# Patient Record
Sex: FEMALE | Race: WHITE | NOT HISPANIC OR LATINO | Employment: UNEMPLOYED | URBAN - METROPOLITAN AREA
[De-identification: names, ages, dates, MRNs, and addresses within clinical notes are randomized per-mention and may not be internally consistent; named-entity substitution may affect disease eponyms.]

---

## 2017-05-03 ENCOUNTER — TELEPHONE (OUTPATIENT)
Dept: OBSTETRICS AND GYNECOLOGY | Facility: CLINIC | Age: 22
End: 2017-05-03

## 2017-05-03 NOTE — TELEPHONE ENCOUNTER
----- Message from Anthony Banerjee sent at 5/3/2017  4:35 PM CDT -----  Contact: [phone]/self  Pt would like to speak with the nurse about getting her tubes tied.  Please advise

## 2017-05-03 NOTE — TELEPHONE ENCOUNTER
Returned patients call.   Patient notified that the physician normally does not due this due to age and her only having one child. Patient verbalized understandign.

## 2017-07-11 ENCOUNTER — TELEPHONE (OUTPATIENT)
Dept: OBSTETRICS AND GYNECOLOGY | Facility: CLINIC | Age: 22
End: 2017-07-11

## 2017-07-11 NOTE — TELEPHONE ENCOUNTER
----- Message from Bela Bradley sent at 7/11/2017 12:18 PM CDT -----  Contact: self/[phone]  Patient called to find out if she can be seen today as she thought appointment was for today and not yesterday.    Please call at [phone] and advise.

## 2017-08-14 ENCOUNTER — TELEPHONE (OUTPATIENT)
Dept: OBSTETRICS AND GYNECOLOGY | Facility: CLINIC | Age: 22
End: 2017-08-14

## 2017-08-14 RX ORDER — BUPROPION HYDROCHLORIDE 150 MG/1
TABLET ORAL
Refills: 0 | COMMUNITY
Start: 2017-07-13 | End: 2018-05-31

## 2017-08-14 RX ORDER — PRAZOSIN HYDROCHLORIDE 2 MG/1
CAPSULE ORAL
Refills: 0 | COMMUNITY
Start: 2017-07-13 | End: 2018-05-31

## 2017-08-14 RX ORDER — CLONIDINE HYDROCHLORIDE 0.1 MG/1
TABLET ORAL
Refills: 0 | COMMUNITY
Start: 2017-06-13 | End: 2018-05-31

## 2017-08-14 RX ORDER — FLUORIDE (SODIUM) 1.1 %
PASTE (ML) DENTAL
Refills: 0 | COMMUNITY
Start: 2017-06-07 | End: 2018-05-31

## 2017-08-14 RX ORDER — SERTRALINE HYDROCHLORIDE 100 MG/1
TABLET, FILM COATED ORAL
Refills: 0 | COMMUNITY
Start: 2017-07-13 | End: 2018-05-31

## 2017-08-14 RX ORDER — QUETIAPINE FUMARATE 300 MG/1
TABLET, FILM COATED ORAL
Refills: 0 | COMMUNITY
Start: 2017-07-13 | End: 2018-05-31

## 2017-08-14 RX ORDER — QUETIAPINE FUMARATE 50 MG/1
TABLET, FILM COATED ORAL
Refills: 0 | COMMUNITY
Start: 2017-06-13 | End: 2018-05-31

## 2017-08-14 RX ORDER — MEDROXYPROGESTERONE ACETATE 150 MG/ML
INJECTION, SUSPENSION INTRAMUSCULAR
Refills: 2 | COMMUNITY
Start: 2017-05-08 | End: 2018-12-10

## 2017-08-14 NOTE — TELEPHONE ENCOUNTER
----- Message from Sarah Harris sent at 8/14/2017  2:10 PM CDT -----  Contact: [phone]/self   Patient says her partner have syphilis and she would like to know if she has ever been tested for that before and when was the last time if she has. Please advise.

## 2017-08-14 NOTE — TELEPHONE ENCOUNTER
Returned patients call.   Patient is calling stating that her partner has tested positive for syphilis and she has gone to her local health unit this morning to give blood and be tested herself. Patient was notified that the office does not have any record in her chart for her being tested for syphilis and such testing is only done by patient request. Patient was notified to follow up with the office for further STD testing and annual. Patient verbalized understanding.

## 2017-09-21 ENCOUNTER — OFFICE VISIT (OUTPATIENT)
Dept: URGENT CARE | Facility: CLINIC | Age: 22
End: 2017-09-21
Payer: MEDICAID

## 2017-09-21 VITALS
RESPIRATION RATE: 18 BRPM | OXYGEN SATURATION: 97 % | SYSTOLIC BLOOD PRESSURE: 111 MMHG | WEIGHT: 135 LBS | DIASTOLIC BLOOD PRESSURE: 71 MMHG | BODY MASS INDEX: 26.5 KG/M2 | HEIGHT: 60 IN | HEART RATE: 85 BPM | TEMPERATURE: 99 F

## 2017-09-21 DIAGNOSIS — J32.9 SINUSITIS, UNSPECIFIED CHRONICITY, UNSPECIFIED LOCATION: Primary | ICD-10-CM

## 2017-09-21 PROCEDURE — 3008F BODY MASS INDEX DOCD: CPT | Mod: S$GLB,,, | Performed by: NURSE PRACTITIONER

## 2017-09-21 PROCEDURE — 96372 THER/PROPH/DIAG INJ SC/IM: CPT | Mod: S$GLB,,, | Performed by: NURSE PRACTITIONER

## 2017-09-21 PROCEDURE — 99203 OFFICE O/P NEW LOW 30 MIN: CPT | Mod: 25,S$GLB,, | Performed by: NURSE PRACTITIONER

## 2017-09-21 RX ORDER — BETAMETHASONE SODIUM PHOSPHATE AND BETAMETHASONE ACETATE 3; 3 MG/ML; MG/ML
6 INJECTION, SUSPENSION INTRA-ARTICULAR; INTRALESIONAL; INTRAMUSCULAR; SOFT TISSUE
Status: DISCONTINUED | OUTPATIENT
Start: 2017-09-21 | End: 2017-09-21

## 2017-09-21 RX ORDER — CEFDINIR 300 MG/1
300 CAPSULE ORAL EVERY 12 HOURS
Qty: 14 CAPSULE | Refills: 0 | Status: SHIPPED | OUTPATIENT
Start: 2017-09-25 | End: 2017-10-02

## 2017-09-21 RX ORDER — DEXAMETHASONE SODIUM PHOSPHATE 100 MG/10ML
8 INJECTION INTRAMUSCULAR; INTRAVENOUS
Status: COMPLETED | OUTPATIENT
Start: 2017-09-21 | End: 2017-09-21

## 2017-09-21 RX ADMIN — DEXAMETHASONE SODIUM PHOSPHATE 8 MG: 100 INJECTION INTRAMUSCULAR; INTRAVENOUS at 01:09

## 2017-09-21 NOTE — PROGRESS NOTES
Subjective:       Patient ID: Lisha Sousa is a 22 y.o. female.    Vitals:  height is 5' (1.524 m) and weight is 61.2 kg (135 lb). Her oral temperature is 99.1 °F (37.3 °C). Her blood pressure is 111/71 and her pulse is 85. Her respiration is 18 and oxygen saturation is 97%.     Chief Complaint: Sinusitis    Sinusitis   This is a new problem. The current episode started in the past 7 days. The problem is unchanged. There has been no fever. Her pain is at a severity of 5/10. The pain is moderate. Associated symptoms include congestion, coughing, ear pain, headaches, sinus pressure and a sore throat. Pertinent negatives include no chills, hoarse voice or shortness of breath. Past treatments include oral decongestants. The treatment provided moderate relief.     Review of Systems   Constitution: Negative for chills, fever and malaise/fatigue.   HENT: Positive for congestion, ear pain, sinus pressure and sore throat. Negative for hoarse voice.    Eyes: Negative for discharge and redness.   Cardiovascular: Negative for chest pain, dyspnea on exertion and leg swelling.   Respiratory: Positive for cough and sputum production. Negative for shortness of breath and wheezing.    Musculoskeletal: Negative for myalgias.   Gastrointestinal: Negative for abdominal pain and nausea.   Neurological: Positive for headaches.       Objective:      Physical Exam   Constitutional: She is oriented to person, place, and time. She appears well-developed and well-nourished. She is cooperative.  Non-toxic appearance. She does not appear ill. No distress.   HENT:   Head: Normocephalic and atraumatic.   Right Ear: Hearing, tympanic membrane, external ear and ear canal normal.   Left Ear: Hearing, tympanic membrane, external ear and ear canal normal.   Nose: Mucosal edema present. No rhinorrhea or nasal deformity. No epistaxis. Right sinus exhibits maxillary sinus tenderness. Right sinus exhibits no frontal sinus tenderness. Left sinus exhibits  maxillary sinus tenderness. Left sinus exhibits no frontal sinus tenderness.   Mouth/Throat: Uvula is midline and mucous membranes are normal. No trismus in the jaw. Normal dentition. No uvula swelling. Posterior oropharyngeal erythema (mild with cobblestoning) present.   Eyes: Conjunctivae and lids are normal. No scleral icterus.   Sclera clear bilat   Neck: Trachea normal, full passive range of motion without pain and phonation normal. Neck supple.   Cardiovascular: Normal rate, regular rhythm, normal heart sounds, intact distal pulses and normal pulses.    Pulmonary/Chest: Effort normal and breath sounds normal. No respiratory distress.   Abdominal: Soft. Normal appearance and bowel sounds are normal. She exhibits no distension. There is no tenderness.   Musculoskeletal: Normal range of motion. She exhibits no edema or deformity.   Neurological: She is alert and oriented to person, place, and time. She exhibits normal muscle tone. Coordination normal.   Skin: Skin is warm, dry and intact. She is not diaphoretic. No pallor.   Psychiatric: She has a normal mood and affect. Her speech is normal and behavior is normal. Judgment and thought content normal. Cognition and memory are normal.   Nursing note and vitals reviewed.      Assessment:       1. Sinusitis, unspecified chronicity, unspecified location        Plan:         Sinusitis, unspecified chronicity, unspecified location  -     Discontinue: betamethasone acetate-betamethasone sodium phosphate injection 6 mg; Inject 1 mL (6 mg total) into the muscle one time.  -     cefdinir (OMNICEF) 300 MG capsule; Take 1 capsule (300 mg total) by mouth every 12 (twelve) hours.  Dispense: 14 capsule; Refill: 0  -     dexamethasone injection 8 mg; Inject 0.8 mLs (8 mg total) into the muscle one time.    Please drink plenty of fluids.  Please get plenty of rest.  Please return here or go to the Emergency Department for any concerns or worsening of condition.  If you were given  wait & see antibiotics, please wait 3-5 days before taking them, and only take them if your symptoms have worsened or not improved.  If you do begin taking the antibiotics, please take them to completion.  If you were prescribed antibiotics, please take them to completion.  If you were prescribed a narcotic medication, do not drive or operate heavy equipment or machinery while taking these medications.  If you do not have Hypertension or any history of palpitations, it is ok to take over the counter Sudafed or Mucinex D or Allegra-D or Claritin-D or Zyrtec-D.  If you do take one of the above, it is ok to combine that with plain over the counter Mucinex or Allegra or Claritin or Zyrtec.  If for example you are taking Zyrtec -D, you can combine that with Mucinex, but not Mucinex-D.  If you are taking Mucinex-D, you can combine that with plain Allegra or Claritin or Zyrtec.   If you do have Hypertension or palpitations, it is safe to take Coricidin HBP for relief of sinus symptoms.  We recommend you take over the counter Flonase (Fluticasone) or another nasally inhaled steroid unless you are already taking one.  Nasal irrigation with a saline spray or Netti Pot like device per their directions is also recommended.  If not allergic, please take over the counter Tylenol (Acetaminophen) and/or Motrin (Ibuprofen) as directed for control of pain and/or fever.  Please follow up with your primary care doctor or specialist as needed.    If you  smoke, please stop smoking.

## 2017-09-21 NOTE — LETTER
September 21, 2017      [facility]  [address] [address], [address]  [place] [address]  Phone: [phone]  Fax: [phone]       Patient: Lisha Sousa   YOB: 1995  Date of Visit: 09/21/2017    To Whom It May Concern:    Ximena Sousa  was at [facility] on 09/21/2017. She may return to work/school on 9/21/17 with no restrictions. If you have any questions or concerns, or if I can be of further assistance, please do not hesitate to contact me.    Sincerely,    Ingrid Mayo NP

## 2017-09-21 NOTE — PATIENT INSTRUCTIONS
Please drink plenty of fluids.  Please get plenty of rest.  Please return here or go to the Emergency Department for any concerns or worsening of condition.  If you were given wait & see antibiotics, please wait 3-5 days before taking them, and only take them if your symptoms have worsened or not improved.  If you do begin taking the antibiotics, please take them to completion.  If you were prescribed antibiotics, please take them to completion.  If you were prescribed a narcotic medication, do not drive or operate heavy equipment or machinery while taking these medications.  If you do not have Hypertension or any history of palpitations, it is ok to take over the counter Sudafed or Mucinex D or Allegra-D or Claritin-D or Zyrtec-D.  If you do take one of the above, it is ok to combine that with plain over the counter Mucinex or Allegra or Claritin or Zyrtec.  If for example you are taking Zyrtec -D, you can combine that with Mucinex, but not Mucinex-D.  If you are taking Mucinex-D, you can combine that with plain Allegra or Claritin or Zyrtec.   If you do have Hypertension or palpitations, it is safe to take Coricidin HBP for relief of sinus symptoms.  We recommend you take over the counter Flonase (Fluticasone) or another nasally inhaled steroid unless you are already taking one.  Nasal irrigation with a saline spray or Netti Pot like device per their directions is also recommended.  If not allergic, please take over the counter Tylenol (Acetaminophen) and/or Motrin (Ibuprofen) as directed for control of pain and/or fever.  Please follow up with your primary care doctor or specialist as needed.    If you  smoke, please stop smoking.  Sinusitis (No Antibiotics)    The sinuses are air-filled spaces within the bones of the face. They connect to the inside of the nose. Sinusitis is an inflammation of the tissue lining the sinus cavity. Sinus inflammation can occur during a cold. It can also be due to allergies to  pollens and other particles in the air. It can cause symptoms such as sinus congestion, headache, sore throat, facial swelling and fullness. It may also cause a low-grade fever. No infection is present, and no antibiotic treatment is needed.  Home care  · Drink plenty of water, hot tea, and other liquids. This may help thin mucus. It also may promote sinus drainage.  · Heat may help soothe painful areas of the face. Use a towel soaked in hot water. Or,  the shower and direct the hot spray onto your face. Using a vaporizer along with a menthol rub at night may also help.   · An expectorant containing guaifenesin may help thin the mucus and promote drainage from the sinuses.  · Over-the-counter decongestants may be used unless a similar medicine was prescribed. Nasal sprays work the fastest. Use one that contains phenylephrine or oxymetazoline. First blow the nose gently. Then use the spray. Do not use these medicines more often than directed on the label or symptoms may get worse. You may also use tablets containing pseudoephedrine. Avoid products that combine ingredients, because side effects may be increased. Read labels. You can also ask the pharmacist for help. (NOTE: Persons with high blood pressure should not use decongestants. They can raise blood pressure.)  · Over-the-counter antihistamines may help if allergies contributed to your sinusitis.    · Use acetaminophen or ibuprofen to control pain, unless another pain medicine was prescribed. (If you have chronic liver or kidney disease or ever had a stomach ulcer, talk with your doctor before using these medicines. Aspirin should never be used in anyone under 18 years of age who is ill with a fever. It may cause severe liver damage.)  · Use nasal rinses or irrigation as instructed by your health care provider.  · Don't smoke. This can worsen symptoms.  Follow-up care  Follow up with your healthcare provider or our staff if you are not improving within  the next week.  When to seek medical advice  Call your healthcare provider if any of these occur:  · Green or yellow discharge from the nose or into the throat  · Facial pain or headache becoming more severe  · Stiff neck  · Unusual drowsiness or confusion  · Swelling of the forehead or eyelids  · Vision problems, including blurred or double vision  · Fever of 100.4ºF (38ºC) or higher, or as directed by your healthcare provider  · Seizure  · Breathing problems  · Symptoms not resolving within 10 days  Date Last Reviewed: 4/13/2015  © 3745-7851 "[employer]. [address] [place] [address]. All rights reserved. This information is not intended as a substitute for professional medical care. Always follow your healthcare professional's instructions.

## 2017-09-23 ENCOUNTER — TELEPHONE (OUTPATIENT)
Dept: URGENT CARE | Facility: CLINIC | Age: 22
End: 2017-09-23

## 2018-03-15 ENCOUNTER — DOCUMENTATION ONLY (OUTPATIENT)
Dept: PHARMACY | Facility: HOSPITAL | Age: 23
End: 2018-03-15

## 2018-03-15 ENCOUNTER — OFFICE VISIT (OUTPATIENT)
Dept: OBSTETRICS AND GYNECOLOGY | Facility: CLINIC | Age: 23
End: 2018-03-15
Payer: MEDICAID

## 2018-03-15 VITALS — DIASTOLIC BLOOD PRESSURE: 80 MMHG | WEIGHT: 147.5 LBS | SYSTOLIC BLOOD PRESSURE: 124 MMHG | BODY MASS INDEX: 28.8 KG/M2

## 2018-03-15 DIAGNOSIS — Z12.4 ROUTINE PAPANICOLAOU SMEAR: ICD-10-CM

## 2018-03-15 DIAGNOSIS — Z01.419 WELL WOMAN EXAM WITH ROUTINE GYNECOLOGICAL EXAM: ICD-10-CM

## 2018-03-15 PROCEDURE — 99999 PR PBB SHADOW E&M-EST. PATIENT-LVL II: CPT | Mod: PBBFAC,,, | Performed by: OBSTETRICS & GYNECOLOGY

## 2018-03-15 PROCEDURE — 88175 CYTOPATH C/V AUTO FLUID REDO: CPT

## 2018-03-15 PROCEDURE — 99395 PREV VISIT EST AGE 18-39: CPT | Mod: S$PBB,,, | Performed by: OBSTETRICS & GYNECOLOGY

## 2018-03-15 PROCEDURE — 99212 OFFICE O/P EST SF 10 MIN: CPT | Mod: PBBFAC,PO | Performed by: OBSTETRICS & GYNECOLOGY

## 2018-03-15 RX ORDER — MEDROXYPROGESTERONE ACETATE 150 MG/ML
150 INJECTION, SUSPENSION INTRAMUSCULAR ONCE
Qty: 1 ML | Refills: 0 | Status: SHIPPED | OUTPATIENT
Start: 2018-03-15 | End: 2018-03-15

## 2018-03-15 NOTE — PROGRESS NOTES
Pt received 150mg IM depo provera to Right upper outer side on 03/15/2018. Pt instructed to get next injection on 06/06/2018. Lot [id number] exp 05/31/21

## 2018-03-15 NOTE — PROGRESS NOTES
HPI:   23 y.o.   OB History      Para Term  AB Living    1 1 1     1    SAB TAB Ectopic Multiple Live Births                    Patient's last menstrual period was 2018 (exact date).    Patient is  here for her annual gynecologic exam.  She has no complaints.     ROS:  GENERAL: No fever, chills, fatigability or weight loss.  SKIN: No rashes, itching or changes in color or texture of skin.  HEAD: No headaches or recent head trauma.  EYES: Visual acuity fine. No photophobia, ocular pain or diplopia.  EARS: Denies ear pain, discharge or vertigo.  NOSE: No loss of smell, no epistaxis or postnasal drip.  MOUTH & THROAT: No hoarseness or change in voice. No excessive gum bleeding.  NODES: Denies swollen glands.  CHEST: Denies SNYDER, cyanosis, wheezing, cough and sputum production.  CARDIOVASCULAR: Denies chest pain, PND, orthopnea or reduced exercise tolerance.  ABDOMEN: Appetite fine. No weight loss. Denies diarrhea, abdominal pain, hematemesis or blood in stool.  URINARY: No flank pain, dysuria or hematuria.  PERIPHERAL VASCULAR: No claudication or cyanosis.  MUSCULOSKELETAL: No joint stiffness or swelling. Denies back pain.  NEUROLOGIC: No history of seizures, paralysis, alteration of gait or coordination.    PE:   /80   Wt 66.9 kg (147 lb 7.8 oz)   LMP 2018 (Exact Date)   BMI 28.80 kg/m²   APPEARANCE: Well nourished, well developed, in no acute distress.  NECK: Neck symmetric without masses or thyromegaly.  BREASTS: Symmetrical, no skin changes or visible lesions. No palpable masses, nipple discharge or adenopathy bilaterally.  ABDOMEN: Flat. Soft. No tenderness or masses. No hepatosplenomegaly. No hernias. No CVA tenderness.  VULVA: No lesions. Normal female genitalia.  URETHRAL MEATUS: Normal size and location, no lesions, no prolapse.  URETHRA: No masses, tenderness, prolapse or scarring.  VAGINA: Moist and well rugated, no discharge, no significant cystocele or rectocele.  CERVIX:  No lesions and discharge. PAP done.  UTERUS: Normal size, regular shape, mobile, non-tender, bladder base nontender.  ADNEXA: No masses, tenderness or CDS nodularity.  ANUS PERINEUM: Normal.    PROCEDURES:  Pap smear    Assessment:  Normal Gynecologic Exam    Plan:  Mammogram and Colonoscopy if indicated by current recommendations.  Return to clinic in one year or for any problems or complaints.  Off depo 2 shots  hadnormal cycle/flow few days ago  Wants restart

## 2018-04-13 ENCOUNTER — OFFICE VISIT (OUTPATIENT)
Dept: OBSTETRICS AND GYNECOLOGY | Facility: CLINIC | Age: 23
End: 2018-04-13
Payer: MEDICAID

## 2018-04-13 VITALS
HEIGHT: 60 IN | BODY MASS INDEX: 28 KG/M2 | WEIGHT: 142.63 LBS | SYSTOLIC BLOOD PRESSURE: 98 MMHG | DIASTOLIC BLOOD PRESSURE: 66 MMHG

## 2018-04-13 DIAGNOSIS — R10.2 PELVIC PAIN SYNDROME: Primary | ICD-10-CM

## 2018-04-13 PROCEDURE — 99212 OFFICE O/P EST SF 10 MIN: CPT | Mod: PBBFAC,PO | Performed by: OBSTETRICS & GYNECOLOGY

## 2018-04-13 PROCEDURE — 99213 OFFICE O/P EST LOW 20 MIN: CPT | Mod: S$PBB,,, | Performed by: OBSTETRICS & GYNECOLOGY

## 2018-04-13 PROCEDURE — 99999 PR PBB SHADOW E&M-EST. PATIENT-LVL II: CPT | Mod: PBBFAC,,, | Performed by: OBSTETRICS & GYNECOLOGY

## 2018-04-16 ENCOUNTER — TELEPHONE (OUTPATIENT)
Dept: OBSTETRICS AND GYNECOLOGY | Facility: CLINIC | Age: 23
End: 2018-04-16

## 2018-04-16 NOTE — TELEPHONE ENCOUNTER
----- Message from Michael A. Wiedemann, MD sent at 4/16/2018 12:43 PM CDT -----  Set her up with vag us and appt with me 1-2 weeks, order for us in already, thanks

## 2018-04-16 NOTE — TELEPHONE ENCOUNTER
Pt scheduled for 4/26/18 at 3:00 for u/s and to see Dr. Wiedemann following. Patient verbalized understanding.

## 2018-04-26 ENCOUNTER — PROCEDURE VISIT (OUTPATIENT)
Dept: OBSTETRICS AND GYNECOLOGY | Facility: CLINIC | Age: 23
End: 2018-04-26
Payer: MEDICAID

## 2018-04-26 ENCOUNTER — OFFICE VISIT (OUTPATIENT)
Dept: OBSTETRICS AND GYNECOLOGY | Facility: CLINIC | Age: 23
End: 2018-04-26
Payer: MEDICAID

## 2018-04-26 VITALS
BODY MASS INDEX: 28 KG/M2 | DIASTOLIC BLOOD PRESSURE: 62 MMHG | SYSTOLIC BLOOD PRESSURE: 106 MMHG | WEIGHT: 142.63 LBS | HEIGHT: 60 IN

## 2018-04-26 DIAGNOSIS — R10.2 PELVIC PAIN SYNDROME: Primary | ICD-10-CM

## 2018-04-26 DIAGNOSIS — R10.2 PELVIC PAIN SYNDROME: ICD-10-CM

## 2018-04-26 PROCEDURE — 76830 TRANSVAGINAL US NON-OB: CPT | Mod: PBBFAC,PO

## 2018-04-26 PROCEDURE — 99213 OFFICE O/P EST LOW 20 MIN: CPT | Mod: 25,S$PBB,, | Performed by: OBSTETRICS & GYNECOLOGY

## 2018-04-26 PROCEDURE — 99999 PR PBB SHADOW E&M-EST. PATIENT-LVL II: CPT | Mod: PBBFAC,,, | Performed by: OBSTETRICS & GYNECOLOGY

## 2018-04-26 PROCEDURE — 76830 TRANSVAGINAL US NON-OB: CPT | Mod: 26,S$PBB,, | Performed by: OBSTETRICS & GYNECOLOGY

## 2018-04-26 PROCEDURE — 99212 OFFICE O/P EST SF 10 MIN: CPT | Mod: PBBFAC,25,PO | Performed by: OBSTETRICS & GYNECOLOGY

## 2018-04-30 ENCOUNTER — TELEPHONE (OUTPATIENT)
Dept: OBSTETRICS AND GYNECOLOGY | Facility: CLINIC | Age: 23
End: 2018-04-30

## 2018-04-30 NOTE — PROGRESS NOTES
23 y.o.   OB History      Para Term  AB Living    1 1 1     1    SAB TAB Ectopic Multiple Live Births                     Comlaining of: pelvic pain, mostyl with orgasm, deep   Been on depo so pelvic organs suppressed alreaty  c section x 1  Pain is midline  No rad to side      ROS:  GENERAL: No fever, chills, fatigability or weight loss.  SKIN: No rashes, itching or changes in color or texture of skin.  HEAD: No headaches or recent head trauma.  EYES: Visual acuity fine. No photophobia, ocular pain or diplopia.  EARS: Denies ear pain, discharge or vertigo.  NOSE: No loss of smell, no epistaxis or postnasal drip.  MOUTH & THROAT: No hoarseness or change in voice. No excessive gum bleeding.  NODES: Denies swollen glands.  CHEST: Denies SNYDER, cyanosis, wheezing, cough and sputum production.  CARDIOVASCULAR: Denies chest pain, PND, orthopnea or reduced exercise tolerance.  ABDOMEN: Appetite fine. No weight loss. Denies diarrhea, abdominal pain, hematemesis or blood in stool.  URINARY: No flank pain, dysuria or hematuria.  PERIPHERAL VASCULAR: No claudication or cyanosis.  MUSCULOSKELETAL: No joint stiffness or swelling. Denies back pain.  NEUROLOGIC: No history of seizures, paralysis, alteration of gait or coordination      PE: /62   Ht 5' (1.524 m)   Wt 64.7 kg (142 lb 10.2 oz)   BMI 27.86 kg/m²    Def    20 min discussion about sourcs of pain  Seems to be uterine/intrapelvic in presentation  Us was normla    A as above, pelvic pain, mostly with sex/orgasm  Plan, suspect adhesions from C sectn  Pt wants to proceed with dx scope

## 2018-05-01 NOTE — TELEPHONE ENCOUNTER
----- Message from Fang Jeffery sent at 5/1/2018  3:41 PM CDT -----  Contact: Self [phone]  Patient is calling to see if her procedure has been scheduled. Please advice

## 2018-05-04 ENCOUNTER — TELEPHONE (OUTPATIENT)
Dept: ADMINISTRATIVE | Facility: OTHER | Age: 23
End: 2018-05-04

## 2018-05-04 DIAGNOSIS — R10.2 PELVIC PAIN: Primary | ICD-10-CM

## 2018-05-04 NOTE — TELEPHONE ENCOUNTER
----- Message from Michael A. Wiedemann, MD sent at 4/30/2018  6:28 AM CDT -----  Scope for pelvic pain

## 2018-05-21 ENCOUNTER — ANESTHESIA EVENT (OUTPATIENT)
Dept: SURGERY | Facility: HOSPITAL | Age: 23
End: 2018-05-21
Payer: MEDICAID

## 2018-05-21 ENCOUNTER — OFFICE VISIT (OUTPATIENT)
Dept: OBSTETRICS AND GYNECOLOGY | Facility: CLINIC | Age: 23
End: 2018-05-21
Payer: MEDICAID

## 2018-05-21 VITALS — SYSTOLIC BLOOD PRESSURE: 110 MMHG | BODY MASS INDEX: 27.73 KG/M2 | WEIGHT: 142 LBS | DIASTOLIC BLOOD PRESSURE: 60 MMHG

## 2018-05-21 DIAGNOSIS — R10.2 PELVIC PAIN SYNDROME: Primary | ICD-10-CM

## 2018-05-21 PROCEDURE — 99024 POSTOP FOLLOW-UP VISIT: CPT | Mod: ,,, | Performed by: OBSTETRICS & GYNECOLOGY

## 2018-05-21 PROCEDURE — 99212 OFFICE O/P EST SF 10 MIN: CPT | Mod: PBBFAC,PO | Performed by: OBSTETRICS & GYNECOLOGY

## 2018-05-21 PROCEDURE — 99999 PR PBB SHADOW E&M-EST. PATIENT-LVL II: CPT | Mod: PBBFAC,,, | Performed by: OBSTETRICS & GYNECOLOGY

## 2018-05-21 RX ORDER — SODIUM CHLORIDE, SODIUM LACTATE, POTASSIUM CHLORIDE, CALCIUM CHLORIDE 600; 310; 30; 20 MG/100ML; MG/100ML; MG/100ML; MG/100ML
INJECTION, SOLUTION INTRAVENOUS CONTINUOUS
Status: CANCELLED | OUTPATIENT
Start: 2018-05-21

## 2018-05-21 RX ORDER — LIDOCAINE HYDROCHLORIDE 10 MG/ML
1 INJECTION, SOLUTION EPIDURAL; INFILTRATION; INTRACAUDAL; PERINEURAL ONCE
Status: CANCELLED | OUTPATIENT
Start: 2018-05-21 | End: 2018-05-21

## 2018-05-21 NOTE — PROGRESS NOTES
See notes  Doing dx scope, feel her pain Is reporoducec with movement of uterus, prev section  Theory is that scar tissue present  Can't be sure, may find nothing  ??  Risk of surg discussed  Consent done  Pt wants to proceed

## 2018-05-23 ENCOUNTER — TELEPHONE (OUTPATIENT)
Dept: OBSTETRICS AND GYNECOLOGY | Facility: CLINIC | Age: 23
End: 2018-05-23

## 2018-05-23 NOTE — H&P
22 y/o for dx scope for pelvic pain. Pain is mostly with sex, and movement of uterus reproduces the pain,   Had one prev section, therefore may be adhesions.   Discussed with pt, already on depo so has had medical suppression,   Discussed risks of surgery including not finding reason  pmh neg  psh tonsils, section  meds see list  pe vss  Head/neck normal  abd soft  Pelvis tender,  No masses  extr normal  Assessment as above  Plan, proceed with dx scope

## 2018-05-23 NOTE — TELEPHONE ENCOUNTER
----- Message from Izzy Buckner sent at 5/23/2018  1:20 PM CDT -----  Contact: [phone] self  Patient requested to speak with the nurse about the procedure she is having tomorrow. Please call and advise.

## 2018-05-24 ENCOUNTER — ANESTHESIA (OUTPATIENT)
Dept: SURGERY | Facility: HOSPITAL | Age: 23
End: 2018-05-24
Payer: MEDICAID

## 2018-05-24 ENCOUNTER — HOSPITAL ENCOUNTER (OUTPATIENT)
Facility: HOSPITAL | Age: 23
Discharge: HOME | End: 2018-05-24
Attending: OBSTETRICS & GYNECOLOGY | Admitting: OBSTETRICS & GYNECOLOGY
Payer: MEDICAID

## 2018-05-24 VITALS
RESPIRATION RATE: 16 BRPM | HEART RATE: 68 BPM | SYSTOLIC BLOOD PRESSURE: 99 MMHG | WEIGHT: 140 LBS | BODY MASS INDEX: 27.48 KG/M2 | DIASTOLIC BLOOD PRESSURE: 61 MMHG | HEIGHT: 60 IN | OXYGEN SATURATION: 98 % | TEMPERATURE: 98 F

## 2018-05-24 DIAGNOSIS — R10.2 PELVIC PAIN SYNDROME: ICD-10-CM

## 2018-05-24 LAB
B-HCG UR QL: NEGATIVE
BASOPHILS # BLD AUTO: 0.03 K/UL
BASOPHILS NFR BLD: 0.3 %
CTP QC/QA: YES
DIFFERENTIAL METHOD: NORMAL
EOSINOPHIL # BLD AUTO: 0.2 K/UL
EOSINOPHIL NFR BLD: 2.5 %
ERYTHROCYTE [DISTWIDTH] IN BLOOD BY AUTOMATED COUNT: 13.2 %
HCT VFR BLD AUTO: 39.5 %
HGB BLD-MCNC: 12.9 G/DL
LYMPHOCYTES # BLD AUTO: 3.1 K/UL
LYMPHOCYTES NFR BLD: 32 %
MCH RBC QN AUTO: 27.2 PG
MCHC RBC AUTO-ENTMCNC: 32.7 G/DL
MCV RBC AUTO: 83 FL
MONOCYTES # BLD AUTO: 0.6 K/UL
MONOCYTES NFR BLD: 6.1 %
NEUTROPHILS # BLD AUTO: 5.7 K/UL
NEUTROPHILS NFR BLD: 58.9 %
PLATELET # BLD AUTO: 266 K/UL
PMV BLD AUTO: 10 FL
RBC # BLD AUTO: 4.75 M/UL
WBC # BLD AUTO: 9.73 K/UL

## 2018-05-24 PROCEDURE — 71000039 HC RECOVERY, EACH ADD'L HOUR: Performed by: OBSTETRICS & GYNECOLOGY

## 2018-05-24 PROCEDURE — 25000003 PHARM REV CODE 250

## 2018-05-24 PROCEDURE — 63600175 PHARM REV CODE 636 W HCPCS

## 2018-05-24 PROCEDURE — 63600175 PHARM REV CODE 636 W HCPCS: Performed by: ANESTHESIOLOGY

## 2018-05-24 PROCEDURE — 71000033 HC RECOVERY, INTIAL HOUR: Performed by: OBSTETRICS & GYNECOLOGY

## 2018-05-24 PROCEDURE — 27201423 OPTIME MED/SURG SUP & DEVICES STERILE SUPPLY: Performed by: OBSTETRICS & GYNECOLOGY

## 2018-05-24 PROCEDURE — 71000015 HC POSTOP RECOV 1ST HR: Performed by: OBSTETRICS & GYNECOLOGY

## 2018-05-24 PROCEDURE — 36000708 HC OR TIME LEV III 1ST 15 MIN: Performed by: OBSTETRICS & GYNECOLOGY

## 2018-05-24 PROCEDURE — 71000016 HC POSTOP RECOV ADDL HR: Performed by: OBSTETRICS & GYNECOLOGY

## 2018-05-24 PROCEDURE — 85025 COMPLETE CBC W/AUTO DIFF WBC: CPT

## 2018-05-24 PROCEDURE — 37000009 HC ANESTHESIA EA ADD 15 MINS: Performed by: OBSTETRICS & GYNECOLOGY

## 2018-05-24 PROCEDURE — 37000008 HC ANESTHESIA 1ST 15 MINUTES: Performed by: OBSTETRICS & GYNECOLOGY

## 2018-05-24 PROCEDURE — 00840 ANES IPER PX LOWER ABD NOS: CPT | Performed by: OBSTETRICS & GYNECOLOGY

## 2018-05-24 PROCEDURE — 81025 URINE PREGNANCY TEST: CPT | Performed by: OBSTETRICS & GYNECOLOGY

## 2018-05-24 PROCEDURE — 58662 LAPAROSCOPY EXCISE LESIONS: CPT | Mod: ,,, | Performed by: OBSTETRICS & GYNECOLOGY

## 2018-05-24 PROCEDURE — 25000003 PHARM REV CODE 250: Performed by: OBSTETRICS & GYNECOLOGY

## 2018-05-24 PROCEDURE — 36415 COLL VENOUS BLD VENIPUNCTURE: CPT

## 2018-05-24 PROCEDURE — 36000709 HC OR TIME LEV III EA ADD 15 MIN: Performed by: OBSTETRICS & GYNECOLOGY

## 2018-05-24 RX ORDER — CEFAZOLIN SODIUM 1 G/3ML
INJECTION, POWDER, FOR SOLUTION INTRAMUSCULAR; INTRAVENOUS
Status: DISCONTINUED | OUTPATIENT
Start: 2018-05-24 | End: 2018-05-24

## 2018-05-24 RX ORDER — METOCLOPRAMIDE HYDROCHLORIDE 5 MG/ML
10 INJECTION INTRAMUSCULAR; INTRAVENOUS EVERY 10 MIN PRN
Status: ACTIVE | OUTPATIENT
Start: 2018-05-24 | End: 2018-05-24

## 2018-05-24 RX ORDER — MIDAZOLAM HYDROCHLORIDE 1 MG/ML
1 INJECTION INTRAMUSCULAR; INTRAVENOUS ONCE
Status: COMPLETED | OUTPATIENT
Start: 2018-05-24 | End: 2018-05-24

## 2018-05-24 RX ORDER — HYDROCODONE BITARTRATE AND ACETAMINOPHEN 5; 325 MG/1; MG/1
1 TABLET ORAL EVERY 4 HOURS PRN
Status: DISCONTINUED | OUTPATIENT
Start: 2018-05-24 | End: 2018-05-24 | Stop reason: HOSPADM

## 2018-05-24 RX ORDER — NEOSTIGMINE METHYLSULFATE 1 MG/ML
INJECTION, SOLUTION INTRAVENOUS
Status: DISCONTINUED | OUTPATIENT
Start: 2018-05-24 | End: 2018-05-24

## 2018-05-24 RX ORDER — KETOROLAC TROMETHAMINE 30 MG/ML
INJECTION, SOLUTION INTRAMUSCULAR; INTRAVENOUS
Status: DISCONTINUED | OUTPATIENT
Start: 2018-05-24 | End: 2018-05-24

## 2018-05-24 RX ORDER — PROPOFOL 10 MG/ML
VIAL (ML) INTRAVENOUS
Status: DISCONTINUED | OUTPATIENT
Start: 2018-05-24 | End: 2018-05-24

## 2018-05-24 RX ORDER — OXYCODONE HYDROCHLORIDE 5 MG/1
5 TABLET ORAL
Status: DISCONTINUED | OUTPATIENT
Start: 2018-05-24 | End: 2018-05-24 | Stop reason: HOSPADM

## 2018-05-24 RX ORDER — ONDANSETRON 8 MG/1
8 TABLET, ORALLY DISINTEGRATING ORAL EVERY 8 HOURS PRN
Status: DISCONTINUED | OUTPATIENT
Start: 2018-05-24 | End: 2018-05-24 | Stop reason: HOSPADM

## 2018-05-24 RX ORDER — ONDANSETRON 2 MG/ML
INJECTION INTRAMUSCULAR; INTRAVENOUS
Status: DISCONTINUED | OUTPATIENT
Start: 2018-05-24 | End: 2018-05-24

## 2018-05-24 RX ORDER — MIDAZOLAM HYDROCHLORIDE 1 MG/ML
INJECTION, SOLUTION INTRAMUSCULAR; INTRAVENOUS
Status: DISCONTINUED | OUTPATIENT
Start: 2018-05-24 | End: 2018-05-24

## 2018-05-24 RX ORDER — MIDAZOLAM HYDROCHLORIDE 1 MG/ML
INJECTION INTRAMUSCULAR; INTRAVENOUS
Status: DISCONTINUED
Start: 2018-05-24 | End: 2018-05-24 | Stop reason: HOSPADM

## 2018-05-24 RX ORDER — FENTANYL CITRATE 50 UG/ML
INJECTION, SOLUTION INTRAMUSCULAR; INTRAVENOUS
Status: DISCONTINUED | OUTPATIENT
Start: 2018-05-24 | End: 2018-05-24

## 2018-05-24 RX ORDER — DIPHENHYDRAMINE HYDROCHLORIDE 50 MG/ML
25 INJECTION INTRAMUSCULAR; INTRAVENOUS EVERY 4 HOURS PRN
Status: DISCONTINUED | OUTPATIENT
Start: 2018-05-24 | End: 2018-05-24 | Stop reason: HOSPADM

## 2018-05-24 RX ORDER — DIPHENHYDRAMINE HCL 25 MG
25 CAPSULE ORAL EVERY 4 HOURS PRN
Status: DISCONTINUED | OUTPATIENT
Start: 2018-05-24 | End: 2018-05-24 | Stop reason: HOSPADM

## 2018-05-24 RX ORDER — LIDOCAINE HCL/PF 100 MG/5ML
SYRINGE (ML) INTRAVENOUS
Status: DISCONTINUED | OUTPATIENT
Start: 2018-05-24 | End: 2018-05-24

## 2018-05-24 RX ORDER — GLYCOPYRROLATE 0.2 MG/ML
INJECTION INTRAMUSCULAR; INTRAVENOUS
Status: DISCONTINUED | OUTPATIENT
Start: 2018-05-24 | End: 2018-05-24

## 2018-05-24 RX ORDER — LIDOCAINE HYDROCHLORIDE 10 MG/ML
1 INJECTION, SOLUTION EPIDURAL; INFILTRATION; INTRACAUDAL; PERINEURAL ONCE
Status: DISCONTINUED | OUTPATIENT
Start: 2018-05-24 | End: 2018-05-24 | Stop reason: HOSPADM

## 2018-05-24 RX ORDER — HYDROMORPHONE HYDROCHLORIDE 2 MG/ML
0.5 INJECTION, SOLUTION INTRAMUSCULAR; INTRAVENOUS; SUBCUTANEOUS EVERY 5 MIN PRN
Status: DISCONTINUED | OUTPATIENT
Start: 2018-05-24 | End: 2018-05-24 | Stop reason: HOSPADM

## 2018-05-24 RX ORDER — ROCURONIUM BROMIDE 10 MG/ML
INJECTION, SOLUTION INTRAVENOUS
Status: DISCONTINUED | OUTPATIENT
Start: 2018-05-24 | End: 2018-05-24

## 2018-05-24 RX ORDER — DIPHENHYDRAMINE HYDROCHLORIDE 50 MG/ML
12.5 INJECTION INTRAMUSCULAR; INTRAVENOUS
Status: ACTIVE | OUTPATIENT
Start: 2018-05-24 | End: 2018-05-24

## 2018-05-24 RX ORDER — ACETAMINOPHEN 10 MG/ML
INJECTION, SOLUTION INTRAVENOUS
Status: DISCONTINUED | OUTPATIENT
Start: 2018-05-24 | End: 2018-05-24

## 2018-05-24 RX ORDER — DEXAMETHASONE SODIUM PHOSPHATE 4 MG/ML
INJECTION, SOLUTION INTRA-ARTICULAR; INTRALESIONAL; INTRAMUSCULAR; INTRAVENOUS; SOFT TISSUE
Status: DISCONTINUED | OUTPATIENT
Start: 2018-05-24 | End: 2018-05-24

## 2018-05-24 RX ORDER — SODIUM CHLORIDE, SODIUM LACTATE, POTASSIUM CHLORIDE, CALCIUM CHLORIDE 600; 310; 30; 20 MG/100ML; MG/100ML; MG/100ML; MG/100ML
INJECTION, SOLUTION INTRAVENOUS CONTINUOUS
Status: DISCONTINUED | OUTPATIENT
Start: 2018-05-24 | End: 2018-05-24 | Stop reason: HOSPADM

## 2018-05-24 RX ORDER — MEPERIDINE HYDROCHLORIDE 50 MG/ML
12.5 INJECTION INTRAMUSCULAR; INTRAVENOUS; SUBCUTANEOUS EVERY 10 MIN PRN
Status: ACTIVE | OUTPATIENT
Start: 2018-05-24 | End: 2018-05-24

## 2018-05-24 RX ORDER — CEFAZOLIN SODIUM 2 G/50ML
2 SOLUTION INTRAVENOUS
Status: DISCONTINUED | OUTPATIENT
Start: 2018-05-24 | End: 2018-05-24 | Stop reason: HOSPADM

## 2018-05-24 RX ADMIN — LIDOCAINE HYDROCHLORIDE 80 MG: 20 INJECTION, SOLUTION INTRAVENOUS at 07:05

## 2018-05-24 RX ADMIN — NEOSTIGMINE METHYLSULFATE 4 MG: 1 INJECTION INTRAVENOUS at 07:05

## 2018-05-24 RX ADMIN — FENTANYL CITRATE 100 MCG: 50 INJECTION, SOLUTION INTRAMUSCULAR; INTRAVENOUS at 07:05

## 2018-05-24 RX ADMIN — CEFAZOLIN 2 G: 330 INJECTION, POWDER, FOR SOLUTION INTRAMUSCULAR; INTRAVENOUS at 07:05

## 2018-05-24 RX ADMIN — FENTANYL CITRATE 50 MCG: 50 INJECTION, SOLUTION INTRAMUSCULAR; INTRAVENOUS at 08:05

## 2018-05-24 RX ADMIN — HYDROMORPHONE HYDROCHLORIDE 0.5 MG: 2 INJECTION INTRAMUSCULAR; INTRAVENOUS; SUBCUTANEOUS at 08:05

## 2018-05-24 RX ADMIN — ONDANSETRON 8 MG: 2 INJECTION, SOLUTION INTRAMUSCULAR; INTRAVENOUS at 07:05

## 2018-05-24 RX ADMIN — ACETAMINOPHEN 1000 MG: 10 INJECTION, SOLUTION INTRAVENOUS at 07:05

## 2018-05-24 RX ADMIN — FENTANYL CITRATE 5 MCG: 50 INJECTION, SOLUTION INTRAMUSCULAR; INTRAVENOUS at 08:05

## 2018-05-24 RX ADMIN — ROCURONIUM BROMIDE 20 MG: 10 INJECTION, SOLUTION INTRAVENOUS at 07:05

## 2018-05-24 RX ADMIN — MIDAZOLAM 2 MG: 1 INJECTION INTRAMUSCULAR; INTRAVENOUS at 06:05

## 2018-05-24 RX ADMIN — GLYCOPYRROLATE 0.4 MG: 0.2 INJECTION, SOLUTION INTRAMUSCULAR; INTRAVENOUS at 07:05

## 2018-05-24 RX ADMIN — SODIUM CHLORIDE, SODIUM LACTATE, POTASSIUM CHLORIDE, AND CALCIUM CHLORIDE: .6; .31; .03; .02 INJECTION, SOLUTION INTRAVENOUS at 06:05

## 2018-05-24 RX ADMIN — PROPOFOL 110 MG: 10 INJECTION, EMULSION INTRAVENOUS at 07:05

## 2018-05-24 RX ADMIN — DEXAMETHASONE SODIUM PHOSPHATE 8 MG: 4 INJECTION, SOLUTION INTRAMUSCULAR; INTRAVENOUS at 07:05

## 2018-05-24 RX ADMIN — FENTANYL CITRATE 50 MCG: 50 INJECTION, SOLUTION INTRAMUSCULAR; INTRAVENOUS at 07:05

## 2018-05-24 RX ADMIN — MIDAZOLAM HYDROCHLORIDE 1 MG: 1 INJECTION, SOLUTION INTRAMUSCULAR; INTRAVENOUS at 08:05

## 2018-05-24 RX ADMIN — KETOROLAC TROMETHAMINE 30 MG: 30 INJECTION, SOLUTION INTRAMUSCULAR; INTRAVENOUS at 07:05

## 2018-05-24 NOTE — ANESTHESIA POSTPROCEDURE EVALUATION
Anesthesia Post Evaluation    Patient: Lisha Sousa    Procedure(s) Performed: Procedure(s) (LRB):  LAPAROSCOPY-DIAGNOSTIC (N/A)    Final Anesthesia Type: general  Patient location during evaluation: PACU  Patient participation: Yes- Able to Participate  Level of consciousness: awake and alert  Post-procedure vital signs: reviewed and stable  Pain management: adequate  Airway patency: patent  PONV status at discharge: No PONV  Anesthetic complications: no      Cardiovascular status: blood pressure returned to baseline  Respiratory status: unassisted  Hydration status: euvolemic  Follow-up not needed.        Visit Vitals  BP (!) 96/53   Pulse 70   Temp 36.6 °C (97.9 °F) (Skin)   Resp 14   Ht 5' (1.524 m)   Wt 63.5 kg (140 lb)   SpO2 97%   Breastfeeding? No   BMI 27.34 kg/m²       Pain/Chely Score: Pain Assessment Performed: Yes (5/24/2018  9:12 AM)  Presence of Pain: complains of pain/discomfort (5/24/2018  9:12 AM)  Pain Rating Prior to Med Admin: 3 (5/24/2018  8:34 AM)  Pain Rating Post Med Admin: 2 (5/24/2018  9:12 AM)  Chely Score: 9 (5/24/2018  9:12 AM)

## 2018-05-24 NOTE — BRIEF OP NOTE
Dx scope,   Cautery of one spot of endometriosis, removal of paratubal cyst  Lysis of utero sacral ligaments  ebl min  2 gms ancef, no complications  Full note dictated  vstricker assissting mwiedemann may 24

## 2018-05-24 NOTE — ANESTHESIA PREPROCEDURE EVALUATION
05/24/2018  Lisha Sousa is a 23 y.o., female.    Anesthesia Evaluation    I have reviewed the Patient Summary Reports.    I have reviewed the Nursing Notes.      Review of Systems  Anesthesia Hx:  No problems with previous Anesthesia Denies Hx of Anesthetic complications    Hematology/Oncology:  Hematology Normal        Cardiovascular:   Exercise tolerance: good    Pulmonary:  Pulmonary Normal    Renal/:   renal calculi    Hepatic/GI:  Hepatic/GI Normal    Neurological:  Neurology Normal    Endocrine:  Endocrine Normal    Psych:  Psychiatric Normal           Physical Exam  General:  Well nourished    Airway/Jaw/Neck:  Airway Findings: Mouth Opening: Normal Tongue: Normal  General Airway Assessment: Adult  Mallampati: II  TM Distance: Normal, at least 6 cm       Chest/Lungs:  Chest/Lungs Clear    Heart/Vascular:  Heart Findings: Normal       Mental Status:  Mental Status Findings:  Alert and Oriented, Cooperative         Anesthesia Plan  Type of Anesthesia, risks & benefits discussed:  Anesthesia Type:  general  Patient's Preference:   Intra-op Monitoring Plan: standard ASA monitors  Intra-op Monitoring Plan Comments:   Post Op Pain Control Plan: multimodal analgesia  Post Op Pain Control Plan Comments:   Induction:   IV  Beta Blocker:  Patient is not currently on a Beta-Blocker (No further documentation required).       Informed Consent: Patient understands risks and agrees with Anesthesia plan.  Questions answered. Anesthesia consent signed with patient.  ASA Score: 2     Day of Surgery Review of History & Physical:    H&P update referred to the surgeon.         Ready For Surgery From Anesthesia Perspective.

## 2018-05-24 NOTE — DISCHARGE INSTRUCTIONS
DIET: You may resume your home diet. If nausea is present, increase your diet gradually with fluids and bland foods    ACTIVITY LEVEL: You have received sedation or an anesthetic, you may feel sleepy for several hours. Rest until you are more awake. Gradually resume your normal activities    Medications: Pain medication should be taken only if needed and as directed. If antibiotics are prescribed, the medication should be taken until completed. You will be given an updated list of you medications.    No driving, alcoholic beverages or signing legal documents for next 24 hours or while taking pain medication.       CALL THE DOCTOR:    For any obvious bleeding (some dried blood over the incision is normal).      Redness, swelling, foul smell around incision or fever over 101.   Shortness of breath, Coughing up Bloody sputum, Pains or Swelling in your Calves .   Persistent pain or nausea not relieved by medication.    If any unusual problems or difficulties occur contact your doctor. If you cannot contact your doctor but feel your signs and symptoms warrant a physicians attention return to the emergency room.      Pelvic Laparoscopy    Laparoscopy is a type of surgery done using very small incisions. This type of surgery is possible because of the laparoscope (a long, slender tool with a camera and light). It lets your surgeon see inside the stomach. To perform the surgery, special instruments are inserted into the stomach through small incisions. Pelvic laparoscopy is often used to diagnose and treat the causes of pelvic problems, such as pain and infertility. Laparoscopy often involves:  · A short hospital stay. (You can most likely go home the same day.)  · A quick recovery  · Minimal anesthesia  · Small external scars  · Mild to moderate postoperative pain  Getting ready  To prepare for surgery:  · Tell your surgeon about any medicines you take. Include herbs, supplements, and over-the-counter medicines. You  may need to stop taking certain medicines, such as aspirin, for 7 to 10 days before surgery.  · Do not eat or drink anything after the midnight before surgery.  · Arrange for a ride home after surgery.  Before the procedure  You will most likely be given general anesthesia to make you sleep during the procedure. A catheter may be inserted to drain urine from the bladder.   How pelvic laparoscopy is done  One or more small (quarter- or half-inch) incisions are made near the navel or the pubic hairline, or on either side of the lower belly. The laparoscope is inserted through an incision. It sends images to a video screen, allowing the surgeon a close-up view of the organs. Gas is used to inflate the belly, allowing the surgeon room to see and work. Depending on what is found, surgery to treat the problem may be done at this time.  After the procedure  · Youll be taken to a post-op area to wake up and recover from anesthesia.  · You may feel some shoulder pain. This is due to irritation from the gas used to inflate the belly.  · You may have some discharge from the vagina. If so, ask the nurse for a pad.  · You will be asked to walk around to improve breathing and blood flow.  · If you had a catheter, it will most likely be removed before you go home.  · You can go home as soon as you recover from anesthesia and your condition is stable.  Your recovery  Recovery time depends on which procedure was done through the laparoscope. Your recovery from pelvic laparoscopy may take up to 6 weeks. If it was a simple procedure, like tubal ligation, then 2 weeks is reasonable. For laparoscopic hysterectomies, the recovery may be closer to 6 weeks. While you recover, be sure to follow your healthcare providers instructions. During this time:  · Take pain medicine as prescribed.  · Start eating solid food when you feel ready. To avoid constipation, eat fruits, vegetables, and whole grains. Drink plenty of fluids.  · Dont lift  anything heavy until your healthcare provider says its safe.  · Take it easy for a few days. Ask your healthcare provider when you can return to work, exercise, and sex.  · Arrange for a follow-up visit with your healthcare provider to discuss the results of the procedure.  Call your healthcare provider  Contact your healthcare provider right away if you have any of the following:  · Have chills, or a fever of 100.4°F (38°C) or higher, or as directed by your healthcare provider  · Notice that the incision is red, swollen, or draining  · Have heavy, bright-red vaginal bleeding or a smelly discharge  · Have difficulty urinating  · Experience severe belly pain or bloating  · Have leg pain, redness, or swelling  · Have persistent nausea or vomiting  · Are not improving daily  · Fainting  · Trouble breathing   Date Last Reviewed: 12/1/2016  © 8580-0489 [employer]. [address] [place] [address], [place] [address]. All rights reserved. This information is not intended as a substitute for professional medical care. Always follow your healthcare professional's instructions.

## 2018-05-24 NOTE — PLAN OF CARE
Problem: Patient Care Overview  Goal: Plan of Care Review  Outcome: Ongoing (interventions implemented as appropriate)  Patient still slightly anxious, vss, pain controlled will get patient back to [facility], Dr Bridges for sign out

## 2018-05-24 NOTE — OP NOTE
DATE OF PROCEDURE:  05/24/2018.    SURGEON:  Michael Wiedemann, M.D.    ASSISTANT:  Manjula Clarke.    PREOPERATIVE DIAGNOSES:  Pelvic pain, mostly with intercourse.    POSTOPERATIVE DIAGNOSES:  Pelvic pain, mostly with intercourse.    PROCEDURE:  Diagnostic laparoscopy with fulguration of an endometrial implant   and partial lysis of the uterosacral ligament.    PROCEDURE IN DETAIL:  Double puncture laparoscopy was performed using a 5-mm   trocar in the umbilicus and a 5-mm in the suprapubic region.  Placement of the   Veress needle, insufflation and placement of both trocars were uncomplicated.    The abdomen and pelvis were grossly normal.  However, after further inspection,   there was a 5-mm endometrial implant and the peritoneum and the posterior   cervix.  This was cauterized and was operated with the spatula.  The tubes and   ovaries were beautiful.  There was a paratubal cyst on the left tube, which was   removed easily with electrocautery.  Due to its benign nature, it was not   submitted for pathological evaluation.  There were no other significant   abnormalities and no other evidence of endometriosis.  However, her uterosacral   ligaments appeared tense; therefore they were partially lysed with the spatula   directly at the insertion of the posterior cervix.  Approximately half of the   uterosacral ligament was cauterized lysed.  There was no bleeding encountered.    ____ was that this may be part of her pain problem with intercourse.  With no   other disease process going on, the CO2 was let exit.  All instruments were   removed.  The patient was awakened and taken to recovery.  All counts were   correct.  The urine was clear.  She received 2 g of Ancef IV piggyback.  There   were no complications to the surgery.      FAN  dd: 05/24/2018 08:51:20 (CDT)  td: 05/24/2018 11:32:54 (CDT)  Doc ID   #[id number]  Job ID #[id number]    CC:

## 2018-05-24 NOTE — PLAN OF CARE
Patient sitting up in bed dressed, grandmother at bedside. Patient aaox3. Vss. Denies any pain at this time. Patient urinated without difficulty. Prescription for pain medication given to grandmother per Dr. Wiedemann. Instructions given to the patient per MD orders with time for questioning, verbalized understanding. Patient discharged via wheelchair to the car with grandmother.

## 2018-05-24 NOTE — PLAN OF CARE
Patient sitting up in bed, grandmother at bedside. Patient aaox3. Vss. Speaks and understands english and does not require an . Patient does not need security for valuables. Oriented the patient to the room and pre/ post procedure protocol. Verbalized understanding.

## 2018-05-24 NOTE — TRANSFER OF CARE
Anesthesia Transfer of Care Note    Patient: Lisha Sousa    Procedure(s) Performed: Procedure(s) (LRB):  LAPAROSCOPY-DIAGNOSTIC (N/A)    Patient location: PACU    Transport from OR: Transported from OR on room air with adequate spontaneous ventilation    Post pain: adequate analgesia    Post assessment: no apparent anesthetic complications    Post vital signs: stable    Level of consciousness: awake    Nausea/Vomiting: no nausea/vomiting    Complications: none    Transfer of care protocol was followed      Last vitals:   Visit Vitals  /67 (BP Location: Right arm, Patient Position: Sitting)   Pulse 76   Temp 37.1 °C (98.8 °F) (Skin)   Resp 16   Ht 5' (1.524 m)   Wt 63.5 kg (140 lb)   SpO2 98%   Breastfeeding? No   BMI 27.34 kg/m²

## 2018-05-27 ENCOUNTER — TELEPHONE (OUTPATIENT)
Dept: OBSTETRICS AND GYNECOLOGY | Facility: HOSPITAL | Age: 23
End: 2018-05-27

## 2018-05-31 ENCOUNTER — OFFICE VISIT (OUTPATIENT)
Dept: OBSTETRICS AND GYNECOLOGY | Facility: CLINIC | Age: 23
End: 2018-05-31
Payer: MEDICAID

## 2018-05-31 VITALS
SYSTOLIC BLOOD PRESSURE: 100 MMHG | DIASTOLIC BLOOD PRESSURE: 60 MMHG | BODY MASS INDEX: 28.35 KG/M2 | WEIGHT: 144.38 LBS | HEIGHT: 60 IN

## 2018-05-31 DIAGNOSIS — Z98.890 POST-OPERATIVE STATE: Primary | ICD-10-CM

## 2018-05-31 PROCEDURE — 99024 POSTOP FOLLOW-UP VISIT: CPT | Mod: ,,, | Performed by: OBSTETRICS & GYNECOLOGY

## 2018-05-31 PROCEDURE — 99212 OFFICE O/P EST SF 10 MIN: CPT | Mod: PBBFAC,PO | Performed by: OBSTETRICS & GYNECOLOGY

## 2018-05-31 PROCEDURE — 99999 PR PBB SHADOW E&M-EST. PATIENT-LVL II: CPT | Mod: PBBFAC,,, | Performed by: OBSTETRICS & GYNECOLOGY

## 2018-05-31 NOTE — PROGRESS NOTES
Post scope  Saw spot endo behind cx, cauterized  Lyse utero sacral  L tube cyst on stalk removed  Discusses surgery    Observe for pain, pt to call  Healed well

## 2018-06-13 ENCOUNTER — DOCUMENTATION ONLY (OUTPATIENT)
Dept: PHARMACY | Facility: CLINIC | Age: 23
End: 2018-06-13

## 2018-06-13 NOTE — PROGRESS NOTES
Lisha Sousa received IM Depo Provera to the left upper outer side of the deltoid on 6/13/2018.    The patient was instructed to get the next injection on 09/04/2018.    Lot Number: [id number]  Expiration Date: 05/31/2022

## 2018-09-11 ENCOUNTER — TELEPHONE (OUTPATIENT)
Dept: OBSTETRICS AND GYNECOLOGY | Facility: CLINIC | Age: 23
End: 2018-09-11

## 2018-09-11 NOTE — TELEPHONE ENCOUNTER
----- Message from Latasha Bradley sent at 9/11/2018 11:20 AM CDT -----  Contact: [phone]/ self   Patient requesting to speak with you regarding coming in earlier for appt scheduled on 9/12/18 for 4p.m as she has to get her daughter from school for 3:30. Please call and advise.

## 2018-09-12 ENCOUNTER — CLINICAL SUPPORT (OUTPATIENT)
Dept: OBSTETRICS AND GYNECOLOGY | Facility: CLINIC | Age: 23
End: 2018-09-12
Payer: MEDICAID

## 2018-09-12 DIAGNOSIS — Z30.42 ENCOUNTER FOR DEPO-PROVERA CONTRACEPTION: Primary | ICD-10-CM

## 2018-09-12 PROCEDURE — 96372 THER/PROPH/DIAG INJ SC/IM: CPT | Mod: PBBFAC,PO

## 2018-09-12 PROCEDURE — 99999 PR PBB SHADOW E&M-EST. PATIENT-LVL I: CPT | Mod: PBBFAC,,,

## 2018-09-12 PROCEDURE — 99211 OFF/OP EST MAY X REQ PHY/QHP: CPT | Mod: PBBFAC,PO

## 2018-09-12 RX ORDER — MEDROXYPROGESTERONE ACETATE 150 MG/ML
150 INJECTION, SUSPENSION INTRAMUSCULAR
Status: SHIPPED | OUTPATIENT
Start: 2018-09-12

## 2018-09-12 RX ADMIN — MEDROXYPROGESTERONE ACETATE 150 MG: 150 INJECTION, SUSPENSION INTRAMUSCULAR at 04:09

## 2018-09-12 NOTE — PROGRESS NOTES
Administered Depo Provera 150 mg/ml @ 1605 09/12/2018  1 ml IM inj in R upper quad gluteus  Patient Tolerated well.  Patient instructed to return on 12/04/18 for next injection.   Patient verbalized understanding.   Lot:[id number]  Exp: 05/2020  Reviewed patient pain scale, allergies, medications, preffered pharmacy, fall risk, and advance directives verbally prior to injection.     Date of last pap/visit:3/22/2018  Last Depo-Provera: 7/25/2016  UPT indicated?No  Ordering Provider: Dr. Wiedemann

## 2018-12-10 ENCOUNTER — CLINICAL SUPPORT (OUTPATIENT)
Dept: OBSTETRICS AND GYNECOLOGY | Facility: CLINIC | Age: 23
End: 2018-12-10
Payer: MEDICAID

## 2018-12-10 ENCOUNTER — OFFICE VISIT (OUTPATIENT)
Dept: OBSTETRICS AND GYNECOLOGY | Facility: CLINIC | Age: 23
End: 2018-12-10
Payer: MEDICAID

## 2018-12-10 VITALS
BODY MASS INDEX: 27.98 KG/M2 | DIASTOLIC BLOOD PRESSURE: 70 MMHG | WEIGHT: 142.5 LBS | SYSTOLIC BLOOD PRESSURE: 114 MMHG | HEIGHT: 60 IN

## 2018-12-10 DIAGNOSIS — N89.8 VAGINAL DISCHARGE: Primary | ICD-10-CM

## 2018-12-10 DIAGNOSIS — Z30.42 ENCOUNTER FOR DEPO-PROVERA CONTRACEPTION: Primary | ICD-10-CM

## 2018-12-10 PROCEDURE — 99999 PR PBB SHADOW E&M-EST. PATIENT-LVL III: CPT | Mod: PBBFAC,,, | Performed by: OBSTETRICS & GYNECOLOGY

## 2018-12-10 PROCEDURE — 99213 OFFICE O/P EST LOW 20 MIN: CPT | Mod: S$PBB,,, | Performed by: OBSTETRICS & GYNECOLOGY

## 2018-12-10 PROCEDURE — 99211 OFF/OP EST MAY X REQ PHY/QHP: CPT | Mod: PBBFAC,27,PO,25

## 2018-12-10 PROCEDURE — 87660 TRICHOMONAS VAGIN DIR PROBE: CPT

## 2018-12-10 PROCEDURE — 87591 N.GONORRHOEAE DNA AMP PROB: CPT

## 2018-12-10 PROCEDURE — 99999 PR PBB SHADOW E&M-EST. PATIENT-LVL I: CPT | Mod: PBBFAC,,,

## 2018-12-10 PROCEDURE — 96372 THER/PROPH/DIAG INJ SC/IM: CPT | Mod: PBBFAC,PO

## 2018-12-10 PROCEDURE — 99213 OFFICE O/P EST LOW 20 MIN: CPT | Mod: PBBFAC,PO,25 | Performed by: OBSTETRICS & GYNECOLOGY

## 2018-12-10 RX ADMIN — MEDROXYPROGESTERONE ACETATE 150 MG: 150 INJECTION, SUSPENSION INTRAMUSCULAR at 01:12

## 2018-12-10 NOTE — PROGRESS NOTES
23 y.o.   OB History      Para Term  AB Living    1  1     1    SAB TAB Ectopic Multiple Live Births                     Comlaining of:vag dc  Had scope in spring, feels great on depo  Gi and gu good      ROS:  GENERAL: No fever, chills, fatigability or weight loss.  SKIN: No rashes, itching or changes in color or texture of skin.  HEAD: No headaches or recent head trauma.  EYES: Visual acuity fine. No photophobia, ocular pain or diplopia.  EARS: Denies ear pain, discharge or vertigo.  NOSE: No loss of smell, no epistaxis or postnasal drip.  MOUTH & THROAT: No hoarseness or change in voice. No excessive gum bleeding.  NODES: Denies swollen glands.  CHEST: Denies SNYDER, cyanosis, wheezing, cough and sputum production.  CARDIOVASCULAR: Denies chest pain, PND, orthopnea or reduced exercise tolerance.  ABDOMEN: Appetite fine. No weight loss. Denies diarrhea, abdominal pain, hematemesis or blood in stool.  URINARY: No flank pain, dysuria or hematuria.  PERIPHERAL VASCULAR: No claudication or cyanosis.  MUSCULOSKELETAL: No joint stiffness or swelling. Denies back pain.  NEUROLOGIC: No history of seizures, paralysis, alteration of gait or coordination      PE: /70   Ht 5' (1.524 m)   Wt 64.7 kg (142 lb 8.4 oz)   BMI 27.84 kg/m²    abd soft  Cultures done    Discussed will call pt    A vaginitis  Plan., check cultures

## 2018-12-10 NOTE — PROGRESS NOTES
Administered Depo Provera 150 mg/ml @ 1332 12/10/2018  1 ml IM inj in L upper quad gluteus  Patient Tolerated well.  Patient instructed to return on 03/03/2019 for next injection.   Patient verbalized understanding.   Lot:[id number]  Exp: 07/2020  Reviewed patient pain scale, allergies, medications, preffered pharmacy, fall risk, and advance directives verbally prior to injection.     Date of last pap/visit:3/22/2018  Last Depo-Provera: 9/12/2018  UPT indicated?No  Ordering Provider: Dr. Wiedemann

## 2018-12-11 LAB
CANDIDA RRNA VAG QL PROBE: POSITIVE
G VAGINALIS RRNA GENITAL QL PROBE: NEGATIVE
T VAGINALIS RRNA GENITAL QL PROBE: NEGATIVE

## 2018-12-12 LAB
C TRACH DNA SPEC QL NAA+PROBE: NOT DETECTED
N GONORRHOEA DNA SPEC QL NAA+PROBE: NOT DETECTED

## 2018-12-13 RX ORDER — FLUCONAZOLE 100 MG/1
100 TABLET ORAL DAILY
Qty: 3 TABLET | Refills: 0 | Status: SHIPPED | OUTPATIENT
Start: 2018-12-13 | End: 2018-12-16

## 2018-12-13 NOTE — TELEPHONE ENCOUNTER
----- Message from Michael A. Wiedemann, MD sent at 12/12/2018 12:38 PM CST -----  Yay, std negatvie, only showed yeast, pend diflucan 100 x 3, thaniks

## 2019-02-27 ENCOUNTER — TELEPHONE (OUTPATIENT)
Dept: OBSTETRICS AND GYNECOLOGY | Facility: CLINIC | Age: 24
End: 2019-02-27

## 2019-02-27 NOTE — TELEPHONE ENCOUNTER
----- Message from Anthony Banerjee sent at 2/27/2019  9:32 AM CST -----  Contact: [phone]/self  Patient needs to be seen before March 12, 2019 for her depo injection.  Please call and advise    Patient

## 2019-03-26 ENCOUNTER — CLINICAL SUPPORT (OUTPATIENT)
Dept: OBSTETRICS AND GYNECOLOGY | Facility: CLINIC | Age: 24
End: 2019-03-26
Payer: MEDICAID

## 2019-03-26 ENCOUNTER — DOCUMENTATION ONLY (OUTPATIENT)
Dept: PHARMACY | Facility: CLINIC | Age: 24
End: 2019-03-26

## 2019-03-26 DIAGNOSIS — Z30.42 ENCOUNTER FOR DEPO-PROVERA CONTRACEPTION: Primary | ICD-10-CM

## 2019-03-26 LAB
B-HCG UR QL: NEGATIVE
CTP QC/QA: YES

## 2019-03-26 PROCEDURE — 81025 URINE PREGNANCY TEST: CPT | Mod: PBBFAC,PO

## 2019-03-26 NOTE — PROGRESS NOTES
UPT done with Negative results. Pt instructed to report to the outpatient pharmacy for depo provera injection. Pt verbalized understanding.

## 2019-03-26 NOTE — PROGRESS NOTES
Lisha Sousa received IM Depo Provera to the right upper gluteal lower hip region on 3/26/2019.    The patient was instructed to get the next injection on 06/16/2019.    Lot Number: [id number]  Expiration Date: 12/20

## 2019-11-04 ENCOUNTER — OFFICE VISIT (OUTPATIENT)
Dept: OBSTETRICS AND GYNECOLOGY | Facility: CLINIC | Age: 24
End: 2019-11-04
Payer: MEDICAID

## 2019-11-04 VITALS
SYSTOLIC BLOOD PRESSURE: 104 MMHG | HEIGHT: 60 IN | DIASTOLIC BLOOD PRESSURE: 68 MMHG | WEIGHT: 134.38 LBS | BODY MASS INDEX: 26.38 KG/M2

## 2019-11-04 DIAGNOSIS — N39.0 URINARY TRACT INFECTION WITH HEMATURIA, SITE UNSPECIFIED: ICD-10-CM

## 2019-11-04 DIAGNOSIS — Z12.4 ROUTINE PAPANICOLAOU SMEAR: Primary | ICD-10-CM

## 2019-11-04 DIAGNOSIS — R31.9 URINARY TRACT INFECTION WITH HEMATURIA, SITE UNSPECIFIED: ICD-10-CM

## 2019-11-04 DIAGNOSIS — Z30.9 ENCOUNTER FOR CONTRACEPTIVE MANAGEMENT, UNSPECIFIED TYPE: ICD-10-CM

## 2019-11-04 LAB
B-HCG UR QL: NEGATIVE
CTP QC/QA: YES

## 2019-11-04 PROCEDURE — 87077 CULTURE AEROBIC IDENTIFY: CPT

## 2019-11-04 PROCEDURE — 87186 SC STD MICRODIL/AGAR DIL: CPT

## 2019-11-04 PROCEDURE — 87086 URINE CULTURE/COLONY COUNT: CPT

## 2019-11-04 PROCEDURE — 99999 PR PBB SHADOW E&M-EST. PATIENT-LVL III: CPT | Mod: PBBFAC,,, | Performed by: OBSTETRICS & GYNECOLOGY

## 2019-11-04 PROCEDURE — 99213 OFFICE O/P EST LOW 20 MIN: CPT | Mod: PBBFAC,PO | Performed by: OBSTETRICS & GYNECOLOGY

## 2019-11-04 PROCEDURE — 99999 PR PBB SHADOW E&M-EST. PATIENT-LVL III: ICD-10-PCS | Mod: PBBFAC,,, | Performed by: OBSTETRICS & GYNECOLOGY

## 2019-11-04 PROCEDURE — 87088 URINE BACTERIA CULTURE: CPT

## 2019-11-04 PROCEDURE — 81025 URINE PREGNANCY TEST: CPT | Mod: PBBFAC,PO | Performed by: OBSTETRICS & GYNECOLOGY

## 2019-11-04 PROCEDURE — 99213 PR OFFICE/OUTPT VISIT, EST, LEVL III, 20-29 MIN: ICD-10-PCS | Mod: S$PBB,,, | Performed by: OBSTETRICS & GYNECOLOGY

## 2019-11-04 PROCEDURE — 99213 OFFICE O/P EST LOW 20 MIN: CPT | Mod: S$PBB,,, | Performed by: OBSTETRICS & GYNECOLOGY

## 2019-11-04 RX ORDER — MEDROXYPROGESTERONE ACETATE 150 MG/ML
150 INJECTION, SUSPENSION INTRAMUSCULAR ONCE
Status: COMPLETED | OUTPATIENT
Start: 2019-11-04 | End: 2020-01-29

## 2019-11-04 RX ORDER — NITROFURANTOIN 25; 75 MG/1; MG/1
100 CAPSULE ORAL 2 TIMES DAILY
Qty: 10 CAPSULE | Refills: 1 | Status: SHIPPED | OUTPATIENT
Start: 2019-11-04 | End: 2020-10-07 | Stop reason: ALTCHOICE

## 2019-11-04 NOTE — PROGRESS NOTES
Pt received 150mg IM Depo Provera to the RUOQ on 11/4/19. Pt tolerated well. Pt instructed to get next injection on 1/26/20. Pt verbalized understanding. Lot # [id number]   Exp date 12/20

## 2019-11-08 LAB — BACTERIA UR CULT: ABNORMAL

## 2020-01-22 ENCOUNTER — TELEPHONE (OUTPATIENT)
Dept: OBSTETRICS AND GYNECOLOGY | Facility: CLINIC | Age: 25
End: 2020-01-22

## 2020-01-22 NOTE — TELEPHONE ENCOUNTER
Patient wanted to schedule depo injection offered today but declined  appt scheduled and pt informed upt will be needed  Patient verbalized understanding

## 2020-01-22 NOTE — TELEPHONE ENCOUNTER
----- Message from Harjinder Acosta sent at 1/22/2020 10:59 AM CST -----  Contact: [phone] / self   Patient would like to schedule her depo-shot. Please Advise.

## 2020-01-29 ENCOUNTER — CLINICAL SUPPORT (OUTPATIENT)
Dept: OBSTETRICS AND GYNECOLOGY | Facility: CLINIC | Age: 25
End: 2020-01-29
Payer: MEDICAID

## 2020-01-29 DIAGNOSIS — Z78.9 USES BIRTH CONTROL: Primary | ICD-10-CM

## 2020-01-29 LAB
B-HCG UR QL: NEGATIVE
CTP QC/QA: YES

## 2020-01-29 PROCEDURE — 81025 URINE PREGNANCY TEST: CPT | Mod: PBBFAC,PO

## 2020-01-29 PROCEDURE — 96372 THER/PROPH/DIAG INJ SC/IM: CPT | Mod: PBBFAC,PO

## 2020-01-29 RX ADMIN — MEDROXYPROGESTERONE ACETATE 150 MG: 150 INJECTION, SUSPENSION INTRAMUSCULAR at 03:01

## 2020-01-29 NOTE — PROGRESS NOTES
Pt received 150mg IM Depo Provera to the LUOQ on 1/29/20. Pt tolerated well. Pt instructed to get next injection on 4/22/20. Pt verbalized understanding. Lot # [id number]   Exp date 1/21 UPT neg

## 2020-04-22 ENCOUNTER — CLINICAL SUPPORT (OUTPATIENT)
Dept: OBSTETRICS AND GYNECOLOGY | Facility: CLINIC | Age: 25
End: 2020-04-22
Payer: MEDICAID

## 2020-04-22 PROCEDURE — 96372 THER/PROPH/DIAG INJ SC/IM: CPT | Mod: PBBFAC,PO

## 2020-04-22 RX ADMIN — MEDROXYPROGESTERONE ACETATE 150 MG: 150 INJECTION, SUSPENSION INTRAMUSCULAR at 02:04

## 2020-04-22 NOTE — PROGRESS NOTES
Pt received 150mg IM Depo Provera to the RUOQ on 4/22/20. Pt tolerated well. Pt instructed to get next injection on 7/14/20. Pt verbalized understanding. Lot # [id number]   Exp date 10/22

## 2020-06-16 ENCOUNTER — TELEPHONE (OUTPATIENT)
Dept: OBSTETRICS AND GYNECOLOGY | Facility: CLINIC | Age: 25
End: 2020-06-16

## 2020-06-16 NOTE — TELEPHONE ENCOUNTER
----- Message from Alice Emmanuel sent at 6/16/2020  2:19 PM CDT -----  Regarding: Upcoming Injection  Contact: Patient  Pt is scheduled to have depo injection on 07/14/2020   Pt would like to reschedule appt for 07/16/2020 on the same date as appt with physician     Pt can be reached at [phone]

## 2020-07-16 ENCOUNTER — LAB VISIT (OUTPATIENT)
Dept: LAB | Facility: HOSPITAL | Age: 25
End: 2020-07-16
Attending: OBSTETRICS & GYNECOLOGY
Payer: MEDICAID

## 2020-07-16 ENCOUNTER — OFFICE VISIT (OUTPATIENT)
Dept: OBSTETRICS AND GYNECOLOGY | Facility: CLINIC | Age: 25
End: 2020-07-16
Payer: MEDICAID

## 2020-07-16 VITALS — BODY MASS INDEX: 26.17 KG/M2 | WEIGHT: 134 LBS

## 2020-07-16 DIAGNOSIS — Z11.3 SCREENING FOR VENEREAL DISEASE: ICD-10-CM

## 2020-07-16 DIAGNOSIS — Z12.4 SCREENING FOR MALIGNANT NEOPLASM OF CERVIX: Primary | ICD-10-CM

## 2020-07-16 DIAGNOSIS — Z01.419 WELL WOMAN EXAM WITH ROUTINE GYNECOLOGICAL EXAM: ICD-10-CM

## 2020-07-16 PROCEDURE — 99212 OFFICE O/P EST SF 10 MIN: CPT | Mod: PBBFAC,PO | Performed by: OBSTETRICS & GYNECOLOGY

## 2020-07-16 PROCEDURE — 86703 HIV-1/HIV-2 1 RESULT ANTBDY: CPT

## 2020-07-16 PROCEDURE — 99395 PREV VISIT EST AGE 18-39: CPT | Mod: S$PBB,,, | Performed by: OBSTETRICS & GYNECOLOGY

## 2020-07-16 PROCEDURE — 86592 SYPHILIS TEST NON-TREP QUAL: CPT

## 2020-07-16 PROCEDURE — 99999 PR PBB SHADOW E&M-EST. PATIENT-LVL II: CPT | Mod: PBBFAC,,, | Performed by: OBSTETRICS & GYNECOLOGY

## 2020-07-16 PROCEDURE — 88175 CYTOPATH C/V AUTO FLUID REDO: CPT

## 2020-07-16 PROCEDURE — 99999 PR PBB SHADOW E&M-EST. PATIENT-LVL II: ICD-10-PCS | Mod: PBBFAC,,, | Performed by: OBSTETRICS & GYNECOLOGY

## 2020-07-16 PROCEDURE — 36415 COLL VENOUS BLD VENIPUNCTURE: CPT

## 2020-07-16 PROCEDURE — 87491 CHLMYD TRACH DNA AMP PROBE: CPT

## 2020-07-16 PROCEDURE — 99395 PR PREVENTIVE VISIT,EST,18-39: ICD-10-PCS | Mod: S$PBB,,, | Performed by: OBSTETRICS & GYNECOLOGY

## 2020-07-16 PROCEDURE — 87340 HEPATITIS B SURFACE AG IA: CPT

## 2020-07-16 RX ADMIN — MEDROXYPROGESTERONE ACETATE 150 MG: 150 INJECTION, SUSPENSION INTRAMUSCULAR at 03:07

## 2020-07-16 NOTE — PROGRESS NOTES
Pt received 150mg IM Depo Provera to the LUOQ on 7/16/20. Pt tolerated well. Pt instructed to get next injection on 10/7/20. Pt verbalized understanding. Lot # [id number]   Exp date 8/21

## 2020-07-16 NOTE — PROGRESS NOTES
HPI:   25 y.o.   OB History        1    Para   1    Term   1            AB        Living   1       SAB        TAB        Ectopic        Multiple        Live Births                  No LMP recorded. Patient has had an injection.    Patient is  here for her annual gynecologic exam.  She has no complaints.     ROS:  GENERAL: No fever, chills, fatigability or weight loss.  SKIN: No rashes, itching or changes in color or texture of skin.  HEAD: No headaches or recent head trauma.  EYES: Visual acuity fine. No photophobia, ocular pain or diplopia.  EARS: Denies ear pain, discharge or vertigo.  NOSE: No loss of smell, no epistaxis or postnasal drip.  MOUTH & THROAT: No hoarseness or change in voice. No excessive gum bleeding.  NODES: Denies swollen glands.  CHEST: Denies SNYDER, cyanosis, wheezing, cough and sputum production.  CARDIOVASCULAR: Denies chest pain, PND, orthopnea or reduced exercise tolerance.  ABDOMEN: Appetite fine. No weight loss. Denies diarrhea, abdominal pain, hematemesis or blood in stool.  URINARY: No flank pain, dysuria or hematuria.  PERIPHERAL VASCULAR: No claudication or cyanosis.  MUSCULOSKELETAL: No joint stiffness or swelling. Denies back pain.  NEUROLOGIC: No history of seizures, paralysis, alteration of gait or coordination.    PE:   Wt 60.8 kg (134 lb)   BMI 26.17 kg/m²   APPEARANCE: Well nourished, well developed, in no acute distress.  NECK: Neck symmetric without masses or thyromegaly.  BREASTS: Symmetrical, no skin changes or visible lesions. No palpable masses, nipple discharge or adenopathy bilaterally.  ABDOMEN: Flat. Soft. No tenderness or masses. No hepatosplenomegaly. No hernias. No CVA tenderness.  VULVA: No lesions. Normal female genitalia.  URETHRAL MEATUS: Normal size and location, no lesions, no prolapse.  URETHRA: No masses, tenderness, prolapse or scarring.  VAGINA: Moist and well rugated, no discharge, no significant cystocele or rectocele.  CERVIX: No lesions  and discharge. PAP done.  UTERUS: Normal size, regular shape, mobile, non-tender, bladder base nontender.  ADNEXA: No masses, tenderness or CDS nodularity.  ANUS PERINEUM: Normal.    PROCEDURES:  Pap smear    Assessment:  Normal Gynecologic Exam    Plan:  Mammogram and Colonoscopy if indicated by current recommendations.  Return to clinic in one year or for any problems or complaints.  dogn well on depo  Some bleeding after sex  prob cx  Silver nitrate done  Update prn

## 2020-07-17 LAB
HBV SURFACE AG SERPL QL IA: NEGATIVE
HIV 1+2 AB+HIV1 P24 AG SERPL QL IA: NEGATIVE
RPR SER QL: NORMAL

## 2020-07-22 LAB
C TRACH DNA SPEC QL NAA+PROBE: DETECTED
N GONORRHOEA DNA SPEC QL NAA+PROBE: NOT DETECTED

## 2020-07-23 RX ORDER — AZITHROMYCIN 250 MG/1
TABLET, FILM COATED ORAL
Qty: 8 TABLET | Refills: 0 | Status: SHIPPED | OUTPATIENT
Start: 2020-07-23 | End: 2020-07-28

## 2020-07-23 NOTE — TELEPHONE ENCOUNTER
----- Message from Garry Hillman sent at 7/23/2020  9:15 AM CDT -----  Patient called in returning your call. Please advise.  [phone]    
Her chlamydia was posiitve  Needs azithromycin,  250mg  # 8  Take 4 today  And 4 tomorrow  All at once  appt end august for recheck to see if 'brook-ished' with it  thanks  
Left message to return call    
Pt notified and verbalized understanding. Pt schedule for retest in August   
Admission

## 2020-07-28 LAB
FINAL PATHOLOGIC DIAGNOSIS: NORMAL
Lab: NORMAL

## 2020-09-17 ENCOUNTER — TELEPHONE (OUTPATIENT)
Dept: OBSTETRICS AND GYNECOLOGY | Facility: CLINIC | Age: 25
End: 2020-09-17

## 2020-09-17 NOTE — TELEPHONE ENCOUNTER
Pt would like to know about when she is due for her next depo injection. Pt notified she is due 10/7

## 2020-09-17 NOTE — TELEPHONE ENCOUNTER
----- Message from Yovanny Castellanos sent at 9/17/2020  9:53 AM CDT -----  Regarding: Personal  Contact: Self/ [phone]  Patient requesting to speak with you regarding a personal matter. Please call.

## 2020-10-05 ENCOUNTER — OFFICE VISIT (OUTPATIENT)
Dept: UROLOGY | Facility: CLINIC | Age: 25
End: 2020-10-05
Payer: MEDICAID

## 2020-10-05 VITALS
WEIGHT: 138.63 LBS | BODY MASS INDEX: 27.22 KG/M2 | SYSTOLIC BLOOD PRESSURE: 100 MMHG | DIASTOLIC BLOOD PRESSURE: 65 MMHG | HEIGHT: 60 IN

## 2020-10-05 DIAGNOSIS — Z87.440 HISTORY OF RECURRENT UTI (URINARY TRACT INFECTION): Primary | ICD-10-CM

## 2020-10-05 LAB
BILIRUB SERPL-MCNC: NORMAL MG/DL
BLOOD URINE, POC: NORMAL
CLARITY, POC UA: NORMAL
COLOR, POC UA: YELLOW
GLUCOSE UR QL STRIP: NORMAL
KETONES UR QL STRIP: NORMAL
LEUKOCYTE ESTERASE URINE, POC: NORMAL
NITRITE, POC UA: NORMAL
PH, POC UA: 5.5
PROTEIN, POC: NORMAL
SPECIFIC GRAVITY, POC UA: 1.03
UROBILINOGEN, POC UA: 0.2

## 2020-10-05 PROCEDURE — 87186 SC STD MICRODIL/AGAR DIL: CPT

## 2020-10-05 PROCEDURE — 99203 OFFICE O/P NEW LOW 30 MIN: CPT | Mod: S$PBB,,, | Performed by: NURSE PRACTITIONER

## 2020-10-05 PROCEDURE — 99999 PR PBB SHADOW E&M-EST. PATIENT-LVL III: ICD-10-PCS | Mod: PBBFAC,,, | Performed by: NURSE PRACTITIONER

## 2020-10-05 PROCEDURE — 99999 PR PBB SHADOW E&M-EST. PATIENT-LVL III: CPT | Mod: PBBFAC,,, | Performed by: NURSE PRACTITIONER

## 2020-10-05 PROCEDURE — 87088 URINE BACTERIA CULTURE: CPT

## 2020-10-05 PROCEDURE — 99213 OFFICE O/P EST LOW 20 MIN: CPT | Mod: PBBFAC,PO | Performed by: NURSE PRACTITIONER

## 2020-10-05 PROCEDURE — 99203 PR OFFICE/OUTPT VISIT, NEW, LEVL III, 30-44 MIN: ICD-10-PCS | Mod: S$PBB,,, | Performed by: NURSE PRACTITIONER

## 2020-10-05 PROCEDURE — 87086 URINE CULTURE/COLONY COUNT: CPT

## 2020-10-05 PROCEDURE — 87077 CULTURE AEROBIC IDENTIFY: CPT

## 2020-10-05 PROCEDURE — 81002 URINALYSIS NONAUTO W/O SCOPE: CPT | Mod: PBBFAC,PO | Performed by: NURSE PRACTITIONER

## 2020-10-05 NOTE — LETTER
October 5, 2020      Erika Patel MD  [address] [place] [address]           [place] [facility]  [address] [place] [address]  [place] [address]  Phone: [phone]  Fax: [phone]          Patient: Lisha Sousa   MR Number: [id number]   YOB: 1995   Date of Visit: 10/5/2020       Dear Dr. Erika Patel:    Thank you for referring Lisha Sousa to me for evaluation. Attached you will find relevant portions of my assessment and plan of care.    If you have questions, please do not hesitate to call me. I look forward to following Lisha Sousa along with you.    Sincerely,    Brianna Orosco NP    Enclosure  CC:  No Recipients    If you would like to receive this communication electronically, please contact [email or web address] or [phone] to request more information on [employer] access.    For providers and/or their staff who would like to refer a patient to [facility], please contact us through our one-stop-shop provider referral line, [facility], at [phone].    If you feel you have received this communication in error or would no longer like to receive these types of communications, please e-mail [email or web address]

## 2020-10-05 NOTE — PROGRESS NOTES
Subjective:       Patient ID: Lisha Sousa is a 25 y.o. female.    Chief Complaint: Other (hx of recurrent uti's)    Patient is new to me. She is a 26 yo WF who is here today with c/o hx of recurrent UTIs. Patient referred by her PCP (Dr. Patel). She reports everything is going fine. She was last treated for a UTI in July of this year and reports everything has been going well.     Other  Chronicity: Hx of recurrent UTIs. Episode onset: 2 years ago. The problem occurs intermittently. The problem has been waxing and waning. Associated symptoms include headaches. Pertinent negatives include no abdominal pain, anorexia, arthralgias, change in bowel habit, chills, diaphoresis, fatigue, fever, nausea, swollen glands, urinary symptoms, vomiting or weakness. Nothing aggravates the symptoms. She has tried nothing for the symptoms.     Review of Systems   Constitutional: Negative for appetite change, chills, diaphoresis, fatigue and fever.   Gastrointestinal: Negative for abdominal pain, anorexia, change in bowel habit, constipation, diarrhea, nausea, vomiting and change in bowel habit.   Genitourinary: Negative for decreased urine volume, difficulty urinating, dysuria, flank pain, frequency, hematuria, nocturia and urgency.   Musculoskeletal: Negative.  Negative for arthralgias.   Neurological: Positive for headaches. Negative for dizziness and weakness.   Psychiatric/Behavioral: Negative.          Objective:      Physical Exam  Vitals signs and nursing note reviewed.   Constitutional:       Appearance: She is well-developed.   HENT:      Head: Normocephalic and atraumatic.   Eyes:      Pupils: Pupils are equal, round, and reactive to light.   Neck:      Musculoskeletal: Normal range of motion and neck supple.   Cardiovascular:      Rate and Rhythm: Normal rate and regular rhythm.      Heart sounds: Normal heart sounds.   Pulmonary:      Effort: Pulmonary effort is normal. No respiratory distress.      Breath  sounds: Normal breath sounds.   Abdominal:      General: Bowel sounds are normal.      Palpations: Abdomen is soft.      Tenderness: There is no abdominal tenderness.   Musculoskeletal: Normal range of motion.   Lymphadenopathy:      Cervical: No cervical adenopathy.   Skin:     General: Skin is warm and dry.   Neurological:      Mental Status: She is alert and oriented to person, place, and time.      Coordination: Coordination normal.   Psychiatric:         Behavior: Behavior normal.         Thought Content: Thought content normal.         Judgment: Judgment normal.         Assessment:       1. History of recurrent UTI (urinary tract infection)        Plan:       Lisha was seen today for other.    Diagnoses and all orders for this visit:    History of recurrent UTI (urinary tract infection)  -     POCT URINE DIPSTICK WITHOUT MICROSCOPE  -     Urine culture    Other order  1. Start trial of D-mannos and cranberry pills for bladder health.     Follow-up in 3 months.     Brianna Orosco NP

## 2020-10-07 ENCOUNTER — PATIENT MESSAGE (OUTPATIENT)
Dept: UROLOGY | Facility: CLINIC | Age: 25
End: 2020-10-07

## 2020-10-07 DIAGNOSIS — N30.01 ACUTE CYSTITIS WITH HEMATURIA: Primary | ICD-10-CM

## 2020-10-07 LAB — BACTERIA UR CULT: ABNORMAL

## 2020-10-07 RX ORDER — NITROFURANTOIN 25; 75 MG/1; MG/1
100 CAPSULE ORAL 2 TIMES DAILY
Qty: 20 CAPSULE | Refills: 0 | Status: SHIPPED | OUTPATIENT
Start: 2020-10-07 | End: 2020-10-17

## 2020-10-07 NOTE — PROGRESS NOTES
Repeat urine cx in 2 weeks (10/19/2020) after completion of antibiotic therapy.     Brianna Orosco, NP

## 2020-10-08 ENCOUNTER — TELEPHONE (OUTPATIENT)
Dept: UROLOGY | Facility: CLINIC | Age: 25
End: 2020-10-08

## 2020-10-08 NOTE — TELEPHONE ENCOUNTER
----- Message from Brianna Orosco NP sent at 10/7/2020  3:32 PM CDT -----  Please inform patient she has a UTI. Script for Macro[facility] sent to [facility]. Repeat urine cx in 2 weeks (10/19/2020) after completion of antibiotic therapy. Please schedule patient at [facility] outpatient lab. Thanks.

## 2020-10-22 ENCOUNTER — TELEPHONE (OUTPATIENT)
Dept: OBSTETRICS AND GYNECOLOGY | Facility: CLINIC | Age: 25
End: 2020-10-22

## 2020-10-22 NOTE — TELEPHONE ENCOUNTER
----- Message from Anthony Banerjee sent at 10/22/2020 11:08 AM CDT -----  Contact: patient [phone]  Lisha Sousa calling returning Dr Wiedemann nurse call  Please advise

## 2020-10-22 NOTE — TELEPHONE ENCOUNTER
----- Message from Narayan Hartman sent at 10/22/2020  9:39 AM CDT -----  Regarding: dep shot  Type:  Needs Medical Advice    Who Called: patient   Reason; patient missed appt and would like to reschedule her depo shot   Would the patient rather a call back or a response via [employer]? Call back   Best Call Back Number: [phone]  Additional Information: n/a

## 2020-10-28 ENCOUNTER — TELEPHONE (OUTPATIENT)
Dept: OBSTETRICS AND GYNECOLOGY | Facility: CLINIC | Age: 25
End: 2020-10-28
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
## 2020-11-06 ENCOUNTER — TELEPHONE (OUTPATIENT)
Dept: OBSTETRICS AND GYNECOLOGY | Facility: CLINIC | Age: 25
End: 2020-11-06

## 2020-11-06 NOTE — TELEPHONE ENCOUNTER
Appointment Request From: Lisha Sousa      With Provider: Michael A Wiedemann, MD [[facility]]      Preferred Date Range: 11/18/2020 - 11/25/2020      Preferred Times: Any Time      Reason for visit: Depo Shot      Comments:   Depo Shot

## 2020-11-18 ENCOUNTER — CLINICAL SUPPORT (OUTPATIENT)
Dept: OBSTETRICS AND GYNECOLOGY | Facility: CLINIC | Age: 25
End: 2020-11-18
Payer: MEDICAID

## 2020-11-18 DIAGNOSIS — Z30.9 ENCOUNTER FOR CONTRACEPTIVE MANAGEMENT, UNSPECIFIED TYPE: Primary | ICD-10-CM

## 2020-11-18 LAB
B-HCG UR QL: NEGATIVE
CTP QC/QA: YES

## 2020-11-18 PROCEDURE — 96372 THER/PROPH/DIAG INJ SC/IM: CPT | Mod: PBBFAC,PO

## 2020-11-18 RX ADMIN — MEDROXYPROGESTERONE ACETATE 150 MG: 150 INJECTION, SUSPENSION INTRAMUSCULAR at 02:11

## 2021-02-10 ENCOUNTER — CLINICAL SUPPORT (OUTPATIENT)
Dept: OBSTETRICS AND GYNECOLOGY | Facility: CLINIC | Age: 26
End: 2021-02-10
Payer: MEDICAID

## 2021-02-10 DIAGNOSIS — Z30.42 SURVEILLANCE FOR DEPO-PROVERA CONTRACEPTION: Primary | ICD-10-CM

## 2021-02-10 PROCEDURE — 96372 THER/PROPH/DIAG INJ SC/IM: CPT | Mod: PBBFAC,PO

## 2021-02-10 RX ORDER — MEDROXYPROGESTERONE ACETATE 150 MG/ML
150 INJECTION, SUSPENSION INTRAMUSCULAR
Status: COMPLETED | OUTPATIENT
Start: 2021-02-10 | End: 2021-02-10

## 2021-02-10 RX ADMIN — MEDROXYPROGESTERONE ACETATE 150 MG: 150 INJECTION, SUSPENSION INTRAMUSCULAR at 02:02

## 2021-05-03 ENCOUNTER — TELEPHONE (OUTPATIENT)
Dept: OBSTETRICS AND GYNECOLOGY | Facility: CLINIC | Age: 26
End: 2021-05-03

## 2021-05-03 DIAGNOSIS — N30.00 ACUTE CYSTITIS WITHOUT HEMATURIA: Primary | ICD-10-CM

## 2021-05-04 ENCOUNTER — CLINICAL SUPPORT (OUTPATIENT)
Dept: OBSTETRICS AND GYNECOLOGY | Facility: CLINIC | Age: 26
End: 2021-05-04
Payer: MEDICAID

## 2021-05-04 ENCOUNTER — TELEPHONE (OUTPATIENT)
Dept: OBSTETRICS AND GYNECOLOGY | Facility: CLINIC | Age: 26
End: 2021-05-04

## 2021-05-04 ENCOUNTER — PATIENT MESSAGE (OUTPATIENT)
Dept: RESEARCH | Facility: HOSPITAL | Age: 26
End: 2021-05-04

## 2021-05-04 DIAGNOSIS — Z30.42 SURVEILLANCE FOR DEPO-PROVERA CONTRACEPTION: Primary | ICD-10-CM

## 2021-05-04 PROCEDURE — 96372 THER/PROPH/DIAG INJ SC/IM: CPT | Mod: PBBFAC,PO

## 2021-05-04 RX ORDER — MEDROXYPROGESTERONE ACETATE 150 MG/ML
150 INJECTION, SUSPENSION INTRAMUSCULAR
Qty: 1 ML | Refills: 0 | Status: SHIPPED | OUTPATIENT
Start: 2021-05-04

## 2021-05-04 RX ADMIN — MEDROXYPROGESTERONE ACETATE 150 MG: 150 INJECTION, SUSPENSION INTRAMUSCULAR at 02:05

## 2021-05-10 ENCOUNTER — PATIENT MESSAGE (OUTPATIENT)
Dept: RESEARCH | Facility: HOSPITAL | Age: 26
End: 2021-05-10

## 2021-07-20 ENCOUNTER — OFFICE VISIT (OUTPATIENT)
Dept: OBSTETRICS AND GYNECOLOGY | Facility: CLINIC | Age: 26
End: 2021-07-20
Payer: MEDICAID

## 2021-07-20 ENCOUNTER — CLINICAL SUPPORT (OUTPATIENT)
Dept: OBSTETRICS AND GYNECOLOGY | Facility: CLINIC | Age: 26
End: 2021-07-20
Payer: MEDICAID

## 2021-07-20 VITALS
HEIGHT: 60 IN | BODY MASS INDEX: 27.77 KG/M2 | DIASTOLIC BLOOD PRESSURE: 76 MMHG | SYSTOLIC BLOOD PRESSURE: 108 MMHG | WEIGHT: 141.44 LBS

## 2021-07-20 DIAGNOSIS — Z30.42 SURVEILLANCE FOR DEPO-PROVERA CONTRACEPTION: Primary | ICD-10-CM

## 2021-07-20 DIAGNOSIS — Z01.419 WELL WOMAN EXAM WITH ROUTINE GYNECOLOGICAL EXAM: Primary | ICD-10-CM

## 2021-07-20 DIAGNOSIS — Z30.42 DEPO-PROVERA CONTRACEPTIVE STATUS: ICD-10-CM

## 2021-07-20 PROCEDURE — 99213 OFFICE O/P EST LOW 20 MIN: CPT | Mod: PBBFAC,PO,25 | Performed by: OBSTETRICS & GYNECOLOGY

## 2021-07-20 PROCEDURE — 99999 PR PBB SHADOW E&M-EST. PATIENT-LVL III: CPT | Mod: PBBFAC,,, | Performed by: OBSTETRICS & GYNECOLOGY

## 2021-07-20 PROCEDURE — 88175 CYTOPATH C/V AUTO FLUID REDO: CPT | Performed by: OBSTETRICS & GYNECOLOGY

## 2021-07-20 PROCEDURE — 99395 PR PREVENTIVE VISIT,EST,18-39: ICD-10-PCS | Mod: S$PBB,,, | Performed by: OBSTETRICS & GYNECOLOGY

## 2021-07-20 PROCEDURE — 96372 THER/PROPH/DIAG INJ SC/IM: CPT | Mod: PBBFAC,PO

## 2021-07-20 PROCEDURE — 99999 PR PBB SHADOW E&M-EST. PATIENT-LVL III: ICD-10-PCS | Mod: PBBFAC,,, | Performed by: OBSTETRICS & GYNECOLOGY

## 2021-07-20 PROCEDURE — 99395 PREV VISIT EST AGE 18-39: CPT | Mod: S$PBB,,, | Performed by: OBSTETRICS & GYNECOLOGY

## 2021-07-20 RX ORDER — MEDROXYPROGESTERONE ACETATE 150 MG/ML
150 INJECTION, SUSPENSION INTRAMUSCULAR
Status: COMPLETED | OUTPATIENT
Start: 2021-07-20 | End: 2021-07-20

## 2021-07-20 RX ADMIN — MEDROXYPROGESTERONE ACETATE 150 MG: 150 INJECTION, SUSPENSION INTRAMUSCULAR at 03:07

## 2021-07-27 LAB
FINAL PATHOLOGIC DIAGNOSIS: NORMAL
Lab: NORMAL

## 2021-10-04 ENCOUNTER — HOSPITAL ENCOUNTER (OUTPATIENT)
Dept: RADIOLOGY | Facility: HOSPITAL | Age: 26
Discharge: HOME | End: 2021-10-04
Attending: OBSTETRICS & GYNECOLOGY
Payer: MEDICAID

## 2021-10-04 DIAGNOSIS — Z30.42 DEPO-PROVERA CONTRACEPTIVE STATUS: ICD-10-CM

## 2021-10-04 PROCEDURE — 77080 DXA BONE DENSITY AXIAL: CPT | Mod: TC

## 2021-10-04 PROCEDURE — 77080 DEXA BONE DENSITY SPINE HIP: ICD-10-PCS | Mod: 26,,, | Performed by: RADIOLOGY

## 2021-10-04 PROCEDURE — 77080 DXA BONE DENSITY AXIAL: CPT | Mod: 26,,, | Performed by: RADIOLOGY

## 2021-10-05 ENCOUNTER — TELEPHONE (OUTPATIENT)
Dept: OBSTETRICS AND GYNECOLOGY | Facility: CLINIC | Age: 26
End: 2021-10-05
Payer: MEDICAID

## 2021-10-11 ENCOUNTER — TELEPHONE (OUTPATIENT)
Dept: OBSTETRICS AND GYNECOLOGY | Facility: CLINIC | Age: 26
End: 2021-10-11

## 2021-10-11 DIAGNOSIS — Z30.42 SURVEILLANCE FOR DEPO-PROVERA CONTRACEPTION: Primary | ICD-10-CM

## 2021-10-11 RX ORDER — MEDROXYPROGESTERONE ACETATE 150 MG/ML
150 INJECTION, SUSPENSION INTRAMUSCULAR
Status: SHIPPED | OUTPATIENT
Start: 2021-10-11

## 2021-10-12 ENCOUNTER — CLINICAL SUPPORT (OUTPATIENT)
Dept: OBSTETRICS AND GYNECOLOGY | Facility: CLINIC | Age: 26
End: 2021-10-12
Payer: MEDICAID

## 2021-10-12 DIAGNOSIS — Z30.42 SURVEILLANCE FOR DEPO-PROVERA CONTRACEPTION: Primary | ICD-10-CM

## 2021-10-12 PROCEDURE — 96372 THER/PROPH/DIAG INJ SC/IM: CPT | Mod: PBBFAC,PO

## 2021-10-12 RX ADMIN — MEDROXYPROGESTERONE ACETATE 150 MG: 150 INJECTION, SUSPENSION INTRAMUSCULAR at 04:10

## 2021-10-21 ENCOUNTER — TELEPHONE (OUTPATIENT)
Dept: OBSTETRICS AND GYNECOLOGY | Facility: CLINIC | Age: 26
End: 2021-10-21
Payer: MEDICAID

## 2021-10-23 ENCOUNTER — TELEPHONE (OUTPATIENT)
Dept: OBSTETRICS AND GYNECOLOGY | Facility: CLINIC | Age: 26
End: 2021-10-23
Payer: MEDICAID

## 2022-01-03 ENCOUNTER — TELEPHONE (OUTPATIENT)
Dept: OBSTETRICS AND GYNECOLOGY | Facility: CLINIC | Age: 27
End: 2022-01-03
Payer: MEDICAID

## 2022-01-03 NOTE — TELEPHONE ENCOUNTER
Rescheduled patient from 1/3 to 1/6 due to injection nurse being out. Patient verbalized understanding.

## 2022-01-10 ENCOUNTER — TELEPHONE (OUTPATIENT)
Dept: OBSTETRICS AND GYNECOLOGY | Facility: CLINIC | Age: 27
End: 2022-01-10
Payer: MEDICAID

## 2022-01-10 NOTE — TELEPHONE ENCOUNTER
Spoke with Ms. Lars about her missed depo injection appointment last week. Scheduled for tomorrow, 1/11/22, at 4:20 PM. Patient verbalized understanding.

## 2022-01-11 ENCOUNTER — CLINICAL SUPPORT (OUTPATIENT)
Dept: OBSTETRICS AND GYNECOLOGY | Facility: CLINIC | Age: 27
End: 2022-01-11
Payer: MEDICAID

## 2022-01-11 DIAGNOSIS — Z30.42 SURVEILLANCE FOR DEPO-PROVERA CONTRACEPTION: Primary | ICD-10-CM

## 2022-01-11 PROCEDURE — 96372 THER/PROPH/DIAG INJ SC/IM: CPT | Mod: PBBFAC,PO

## 2022-01-11 RX ADMIN — MEDROXYPROGESTERONE ACETATE 150 MG: 150 INJECTION, SUSPENSION INTRAMUSCULAR at 04:01

## 2022-01-11 NOTE — PROGRESS NOTES
150 mg Depo-Provera given LUOQG. Patient has been receiving Depo with no complications. Depo sheet given. Next Depo due 3/29/22-4/12/22.  Appointment made for 3/30/22 @ 4:20 PM. Patient advised to wait 15 min without signs/symptoms. Patient verbalized understanding. Patient tolerated well.

## 2022-02-10 ENCOUNTER — TELEPHONE (OUTPATIENT)
Dept: OBSTETRICS AND GYNECOLOGY | Facility: CLINIC | Age: 27
End: 2022-02-10

## 2022-02-10 NOTE — TELEPHONE ENCOUNTER
----- Message from Alice Chase sent at 2/10/2022  4:03 PM CST -----  Regarding: Call Back  Type:  Needs Medical Advice    Who Called: pt    Would the patient rather a call back or a response via [facility]? call  Best Call Back Number: [phone]  Additional Information: general inquiry

## 2022-03-30 ENCOUNTER — CLINICAL SUPPORT (OUTPATIENT)
Dept: OBSTETRICS AND GYNECOLOGY | Facility: CLINIC | Age: 27
End: 2022-03-30
Payer: MEDICAID

## 2022-03-30 ENCOUNTER — TELEPHONE (OUTPATIENT)
Dept: OBSTETRICS AND GYNECOLOGY | Facility: CLINIC | Age: 27
End: 2022-03-30

## 2022-03-30 DIAGNOSIS — Z30.42 SURVEILLANCE FOR DEPO-PROVERA CONTRACEPTION: Primary | ICD-10-CM

## 2022-03-30 PROCEDURE — 96372 THER/PROPH/DIAG INJ SC/IM: CPT | Mod: PBBFAC,PO

## 2022-03-30 RX ADMIN — MEDROXYPROGESTERONE ACETATE 150 MG: 150 INJECTION, SUSPENSION INTRAMUSCULAR at 02:03

## 2022-03-30 NOTE — PROGRESS NOTES
150 mg Depo-Provera given RUOQG. Patient has been receiving Depo with no complications. Depo sheet given. Next Depo due 6/15/22-6/29/22.  Appointment made for 6/15/22 @ 4:20 PM. Patient advised to wait 15 min without signs/symptoms. Patient verbalized understanding. Patient tolerated well.

## 2022-03-30 NOTE — TELEPHONE ENCOUNTER
Lisha Gonzalez Dr. just came to get her depo-provera injection, and told me that she has called a few times to try and talk to you, but has not received a call back. She wanted you to call her tomorrow about some general questions. I just wanted to pass the message along.    Have a good rest of your day,    Ceci

## 2022-03-31 NOTE — TELEPHONE ENCOUNTER
"Talked back in October about possibly getting her tubes tied. Pt is just following up with that. States " you weren't sure if you wanted to do that because of her age:.  "

## 2022-04-05 ENCOUNTER — PATIENT MESSAGE (OUTPATIENT)
Dept: OBSTETRICS AND GYNECOLOGY | Facility: CLINIC | Age: 27
End: 2022-04-05
Payer: MEDICAID

## 2022-06-15 ENCOUNTER — TELEPHONE (OUTPATIENT)
Dept: OBSTETRICS AND GYNECOLOGY | Facility: CLINIC | Age: 27
End: 2022-06-15
Payer: MEDICAID

## 2022-06-15 NOTE — TELEPHONE ENCOUNTER
----- Message from Haroon Wei sent at 6/15/2022  1:19 PM CDT -----  Contact: pt  .Type:  Needs Medical Advice    Who Called: pt    Would the patient rather a call back or a response via [facility]? Call back  Best Call Back Number: [phone]  Additional Information: PT. is requesting a dwayne l back regarding her appt. On today for her depo injection @4:20 p.m.  would like to know can she move it tomorrow when she have her appt. With Dr. Koo

## 2022-06-15 NOTE — TELEPHONE ENCOUNTER
Called to reschedule patient for Friday at 4:20pm.  Patient verbalized understanding.    RENETTA Bowen

## 2022-06-16 ENCOUNTER — PATIENT MESSAGE (OUTPATIENT)
Dept: OBSTETRICS AND GYNECOLOGY | Facility: CLINIC | Age: 27
End: 2022-06-16
Payer: MEDICAID

## 2022-06-16 NOTE — TELEPHONE ENCOUNTER
I don't think she has the medicaid consent,   Set up annual with pap and we can do that same time  Openings end July  thanks

## 2022-06-21 ENCOUNTER — TELEPHONE (OUTPATIENT)
Dept: OBSTETRICS AND GYNECOLOGY | Facility: CLINIC | Age: 27
End: 2022-06-21

## 2022-06-21 NOTE — TELEPHONE ENCOUNTER
----- Message from Beverly Navarro sent at 6/20/2022 10:18 AM CDT -----  Type:  Needs Medical Advice    Who Called: pt  Symptoms (please be specific):    How long has patient had these symptoms:    Pharmacy name and phone #:    Would the patient rather a call back or a response via [facility]? portal  Best Call Back Number:   Additional Information: any day after 4:00

## 2022-06-24 ENCOUNTER — CLINICAL SUPPORT (OUTPATIENT)
Dept: OBSTETRICS AND GYNECOLOGY | Facility: CLINIC | Age: 27
End: 2022-06-24
Payer: MEDICAID

## 2022-06-24 DIAGNOSIS — Z30.42 SURVEILLANCE FOR DEPO-PROVERA CONTRACEPTION: Primary | ICD-10-CM

## 2022-06-24 PROCEDURE — 96372 THER/PROPH/DIAG INJ SC/IM: CPT | Mod: PBBFAC,PO

## 2022-06-24 RX ADMIN — MEDROXYPROGESTERONE ACETATE 150 MG: 150 INJECTION, SUSPENSION INTRAMUSCULAR at 04:06

## 2022-06-24 NOTE — PROGRESS NOTES
150 mg Depo-Provera given LUOQG. Patient has been receiving Depo with no complications. Depo sheet given. Next Depo due 9/09/22-9/23/22. Patient advised to wait 15 min without signs/symptoms. Patient verbalized understanding. Patient tolerated well.    Annual appointment scheduled on Monday, 7/25/22, at 3 PM. Patient verbalized understanding.

## 2022-08-25 NOTE — TELEPHONE ENCOUNTER
SUBJECTIVE:   CC: Bette Celeste is an 54 year old woman who presents for preventive health visit.     Patient has been advised of split billing requirements and indicates understanding: Yes     Healthy Habits:     Getting at least 3 servings of Calcium per day:  Yes    Bi-annual eye exam:  Yes    Dental care twice a year:  Yes    Sleep apnea or symptoms of sleep apnea:  None    Diet:  Regular (no restrictions)    Frequency of exercise:  4-5 days/week    Duration of exercise:  30-45 minutes    Taking medications regularly:  Yes    Medication side effects:  None    PHQ-2 Total Score: 0    Additional concerns today:  No          PROBLEMS TO ADD ON...  No    Today's PHQ-2 Score:   PHQ-2 ( 1999 [employer]) 8/24/2022   Q1: Little interest or pleasure in doing things 0   Q2: Feeling down, depressed or hopeless 0   PHQ-2 Score 0   PHQ-2 Total Score (12-17 Years)- Positive if 3 or more points; Administer PHQ-A if positive -   Q1: Little interest or pleasure in doing things Not at all   Q2: Feeling down, depressed or hopeless Not at all   PHQ-2 Score 0       Abuse: Current or Past (Physical, Sexual or Emotional) - No  Do you feel safe in your environment? Yes    Have you ever done Advance Care Planning? (For example, a Health Directive, POLST, or a discussion with a medical provider or your loved ones about your wishes): No, advance care planning information given to patient to review.  Patient plans to discuss their wishes with loved ones or provider.      Social History     Tobacco Use     Smoking status: Never Smoker     Smokeless tobacco: Never Used   Substance Use Topics     Alcohol use: Yes     If you drink alcohol do you typically have >3 drinks per day or >7 drinks per week? No    Reviewed orders with patient.  Reviewed health maintenance and updated orders accordingly - Yes    Breast Cancer Screening:    FHS-7:   Breast CA Risk Assessment (FHS-7) 8/5/2021 8/18/2022   Did any of your first-degree relatives have breast  Pt notified to come in 3/6 at 3:30.   or ovarian cancer? No No   Did any of your relatives have bilateral breast cancer? Yes No   Did any man in your family have breast cancer? No No   Did any woman in your family have breast and ovarian cancer? Yes No   Did any woman in your family have breast cancer before age 50 y? Yes No   Do you have 2 or more relatives with breast and/or ovarian cancer? Yes No   Do you have 2 or more relatives with breast and/or bowel cancer? Yes Yes       Mammogram Screening: Recommended annual mammography  Pertinent mammograms are reviewed under the imaging tab.    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   HENT: Negative for congestion, ear pain, hearing loss and sore throat.    Eyes: Negative for pain and visual disturbance.   Respiratory: Negative for cough and shortness of breath.    Cardiovascular: Negative for chest pain, palpitations and peripheral edema.   Gastrointestinal: Negative for abdominal pain, constipation, diarrhea, heartburn, hematochezia and nausea.   Breasts:  Negative for tenderness, breast mass and discharge.   Genitourinary:  "Negative for dysuria, frequency, genital sores, hematuria, pelvic pain, urgency, vaginal bleeding and vaginal discharge.   Musculoskeletal: Negative for arthralgias, joint swelling and myalgias.   Skin: Negative for rash.   Neurological: Negative for dizziness, weakness, headaches and paresthesias.   Psychiatric/Behavioral: Negative for mood changes. The patient is not nervous/anxious.         OBJECTIVE:   BP 97/71   Pulse 82   Temp 97  F (36.1  C) (Temporal)   Ht 1.651 m (5' 5\")   Wt 44.5 kg (98 lb)   LMP 05/01/2019   SpO2 100%   BMI 16.31 kg/m    Physical Exam  GENERAL: healthy, alert and no distress  NECK: no adenopathy, no asymmetry, masses, or scars and thyroid normal to palpation  RESP: lungs clear to auscultation - no rales, rhonchi or wheezes  CV: regular rate and rhythm, normal S1 S2, no S3 or S4, no murmur, click or rub, no peripheral edema and peripheral pulses strong  ABDOMEN: soft, nontender, no hepatosplenomegaly, no masses and bowel sounds normal  MS: no gross musculoskeletal defects noted, no edema    Diagnostic Test Results:  Labs reviewed in Epic    ASSESSMENT/PLAN:   Bette was seen today for physical.    Diagnoses and all orders for this visit:    Lipid screening  -     Lipid Profile; Future  -     Lipid Profile    Routine history and physical examination of adult        Patient has been advised of split billing requirements and indicates understanding: Yes    COUNSELING:  Reviewed preventive health counseling, as reflected in patient instructions    Estimated body mass index is 16.89 kg/m  as calculated from the following:    Height as of 7/5/21: 1.651 m (5' 5\").    Weight as of 3/30/22: 46 kg (101 lb 8 oz).        She reports that she has never smoked. She has never used smokeless tobacco.      Counseling Resources:  ATP IV Guidelines  Pooled Cohorts Equation Calculator  Breast Cancer Risk Calculator  BRCA-Related Cancer Risk Assessment: FHS-7 Tool  FRAX Risk Assessment  ICSI Preventive " Guidelines  Dietary Guidelines for Americans, 2010  USDA's MyPlate  ASA Prophylaxis  Lung CA Screening    Alexis Chatman MD  [facility]

## 2022-09-08 ENCOUNTER — TELEPHONE (OUTPATIENT)
Dept: OBSTETRICS AND GYNECOLOGY | Facility: CLINIC | Age: 27
End: 2022-09-08

## 2022-09-08 RX ORDER — MEDROXYPROGESTERONE ACETATE 150 MG/ML
150 INJECTION, SUSPENSION INTRAMUSCULAR
Status: COMPLETED | OUTPATIENT
Start: 2022-09-08 | End: 2022-09-09

## 2022-09-08 NOTE — TELEPHONE ENCOUNTER
----- Message from Haroon Wei sent at 9/8/2022 11:17 AM CDT -----  Contact: pt  .Type:  Needs Medical Advice    Who Called: pt    Would the patient rather a call back or a response via [facility]? Call back  Best Call Back Number: [phone]  Additional Information: Pt. Is calling to schedule her Depo injection

## 2022-09-09 ENCOUNTER — CLINICAL SUPPORT (OUTPATIENT)
Dept: OBSTETRICS AND GYNECOLOGY | Facility: CLINIC | Age: 27
End: 2022-09-09
Payer: MEDICAID

## 2022-09-09 DIAGNOSIS — Z30.42 SURVEILLANCE FOR DEPO-PROVERA CONTRACEPTION: Primary | ICD-10-CM

## 2022-09-09 PROCEDURE — 96372 THER/PROPH/DIAG INJ SC/IM: CPT | Mod: PBBFAC,PO

## 2022-09-09 RX ADMIN — MEDROXYPROGESTERONE ACETATE 150 MG: 150 INJECTION, SUSPENSION INTRAMUSCULAR at 04:09

## 2022-09-09 NOTE — PROGRESS NOTES
150 mg Depo-Provera given RUOQG. Patient has been receiving Depo with no complications. Depo sheet given. Next Depo due 11/25/22-12/09/22. Next injection scheduled for 11/28/22 @ 4:20 PM. Patient advised to wait 15 min without signs/symptoms. Patient verbalized understanding. Patient tolerated well.      Educated patient that annual appointment needed before next injection. Patient stated that last annual appointment she was scheduled for, she had to reschedule. Patient scheduled for annual appointment on 10/04/22 @ 3:00 PM. Patient verbalized understanding.

## 2022-11-25 ENCOUNTER — TELEPHONE (OUTPATIENT)
Dept: OBSTETRICS AND GYNECOLOGY | Facility: CLINIC | Age: 27
End: 2022-11-25

## 2022-11-25 NOTE — TELEPHONE ENCOUNTER
Good Morning Lisha Dos Santos is coming Monday afternoon to get her depo injection. Her last annual was on 7/20/21. She is scheduled for her next annual on 12/05/22. Do you mind signing the one time pended depo order whenever you get a chance?    Thanks in advance,    -Ceci

## 2022-11-27 RX ORDER — MEDROXYPROGESTERONE ACETATE 150 MG/ML
150 INJECTION, SUSPENSION INTRAMUSCULAR
Status: SHIPPED | OUTPATIENT
Start: 2022-11-27

## 2022-11-28 ENCOUNTER — TELEPHONE (OUTPATIENT)
Dept: OBSTETRICS AND GYNECOLOGY | Facility: CLINIC | Age: 27
End: 2022-11-28

## 2022-11-28 NOTE — TELEPHONE ENCOUNTER
LVM notifying patient the reason for call is to r/s her missed injection appointment from this afternoon. Asked for call back. Will follow up with [employer] message.

## 2022-12-02 ENCOUNTER — PATIENT MESSAGE (OUTPATIENT)
Dept: OBSTETRICS AND GYNECOLOGY | Facility: CLINIC | Age: 27
End: 2022-12-02

## 2023-01-12 ENCOUNTER — TELEPHONE (OUTPATIENT)
Dept: OBSTETRICS AND GYNECOLOGY | Facility: CLINIC | Age: 28
End: 2023-01-12
Payer: MEDICAID

## 2023-01-12 RX ORDER — MEDROXYPROGESTERONE ACETATE 150 MG/ML
150 INJECTION, SUSPENSION INTRAMUSCULAR
Status: COMPLETED | OUTPATIENT
Start: 2023-01-12 | End: 2023-01-18

## 2023-01-12 NOTE — TELEPHONE ENCOUNTER
Pt is overdue for her depo injection.    Scheduled for tomorrow. Please place order for depo for tomorrow    ----- Message from Domonique Lozano sent at 1/12/2023  1:52 PM CST -----  Needs advice from nurse:      Who Called:pt  Regarding:needs to scheduled depo injection  Would the patient rather a call back or [facility]IA [facility]?  Best Call Back number:[phone]  Additional Info:

## 2023-01-12 NOTE — TELEPHONE ENCOUNTER
----- Message from Domonique Lozano sent at 1/12/2023  1:52 PM CST -----  Needs advice from nurse:      Who Called:pt  Regarding:needs to scheduled depo injection  Would the patient rather a call back or [facility]IA [facility]?  Best Call Back number:[phone]  Additional Info:

## 2023-01-13 ENCOUNTER — TELEPHONE (OUTPATIENT)
Dept: OBSTETRICS AND GYNECOLOGY | Facility: CLINIC | Age: 28
End: 2023-01-13
Payer: MEDICAID

## 2023-01-13 NOTE — TELEPHONE ENCOUNTER
Patient transferred to me via [facility] call center to r/s depo injection that was scheduled this afternoon. Patient rescheduled for depo injection next week on Wednesday, 1/18/23, at 4:20 PM. Educated patient to come to appointment with a full bladder so that a urine sample can be collected for a urine pregnancy test. Patient verbalized understanding. No other questions or concerns.

## 2023-01-18 ENCOUNTER — CLINICAL SUPPORT (OUTPATIENT)
Dept: OBSTETRICS AND GYNECOLOGY | Facility: CLINIC | Age: 28
End: 2023-01-18
Payer: MEDICAID

## 2023-01-18 ENCOUNTER — TELEPHONE (OUTPATIENT)
Dept: OBSTETRICS AND GYNECOLOGY | Facility: CLINIC | Age: 28
End: 2023-01-18
Payer: MEDICAID

## 2023-01-18 ENCOUNTER — TELEPHONE (OUTPATIENT)
Dept: OBSTETRICS AND GYNECOLOGY | Facility: CLINIC | Age: 28
End: 2023-01-18

## 2023-01-18 ENCOUNTER — LAB VISIT (OUTPATIENT)
Dept: LAB | Facility: HOSPITAL | Age: 28
End: 2023-01-18
Attending: OBSTETRICS & GYNECOLOGY
Payer: MEDICAID

## 2023-01-18 DIAGNOSIS — R30.0 DYSURIA: ICD-10-CM

## 2023-01-18 DIAGNOSIS — Z30.42 SURVEILLANCE FOR DEPO-PROVERA CONTRACEPTION: Primary | ICD-10-CM

## 2023-01-18 DIAGNOSIS — R30.0 DYSURIA: Primary | ICD-10-CM

## 2023-01-18 LAB
B-HCG UR QL: NEGATIVE
CTP QC/QA: YES

## 2023-01-18 PROCEDURE — 81025 URINE PREGNANCY TEST: CPT | Mod: PBBFAC,PO

## 2023-01-18 PROCEDURE — 87086 URINE CULTURE/COLONY COUNT: CPT | Performed by: OBSTETRICS & GYNECOLOGY

## 2023-01-18 PROCEDURE — 96372 THER/PROPH/DIAG INJ SC/IM: CPT | Mod: PBBFAC,PO

## 2023-01-18 RX ADMIN — MEDROXYPROGESTERONE ACETATE 150 MG: 150 INJECTION, SUSPENSION INTRAMUSCULAR at 04:01

## 2023-01-18 NOTE — TELEPHONE ENCOUNTER
Pt came in and she mentioned to our nurse jessie that she was having uti symptoms- so dip it and she had 2 + leuk and trace blood- we also place urine cx order to be collected at downstairs lab.

## 2023-01-18 NOTE — PROGRESS NOTES
150 mg Depo-Provera given LUOQG. Patient has been receiving Depo with no complications. Depo sheet given. Next Depo due 04/05/23-04/19/23. Next injection scheduled for 04/06/23 @ 3:50 PM. Patient advised to wait 15 min without signs/symptoms. Patient verbalized understanding. Patient tolerated well.    Patient states she has been frequently urinating, as well as burning when she urinates. Urine dip done. 2+ leukocytes and trace of blood. Dr. Wiedemann notified per telephone encounter from Juan CESPEDES MA. Urine sample sent for urine cx.     Educated patient that annual appointment needed before next injection. Patient scheduled for annual appointment on 04/06/23. Patient verbalized understanding.    UPT negative.

## 2023-01-19 RX ORDER — NITROFURANTOIN 25; 75 MG/1; MG/1
100 CAPSULE ORAL 2 TIMES DAILY
Qty: 10 CAPSULE | Refills: 1 | Status: SHIPPED | OUTPATIENT
Start: 2023-01-19 | End: 2023-05-08 | Stop reason: ALTCHOICE

## 2023-01-20 LAB
BACTERIA UR CULT: NORMAL
BACTERIA UR CULT: NORMAL

## 2023-01-23 NOTE — DISCHARGE SUMMARY
Pt was admitted for and underwent diagnostic scope for pain, see op note  Surgery uncomplicated. She was recovered well and eventually discharged to home in good condition same day surgery, oral pain meds, fu visit in 2 weeks  Reg diet,  No complications to hospital stay  Dx pelvic pain  Post laparoscopy    
20 F s/p gastric sleeve @ [facility] 12/28/22 c/o nausea, vomiting, constipation x 1 month. Seen @ [facility] 2 days ago, negative workup. Saw Dr. Celis today who referred her to ED. Passing gas. Denies f/c, cp, sob, urinary complaints.

## 2023-04-05 ENCOUNTER — TELEPHONE (OUTPATIENT)
Dept: OBSTETRICS AND GYNECOLOGY | Facility: CLINIC | Age: 28
End: 2023-04-05
Payer: MEDICAID

## 2023-04-05 DIAGNOSIS — Z30.42 SURVEILLANCE FOR DEPO-PROVERA CONTRACEPTION: Primary | ICD-10-CM

## 2023-04-05 RX ORDER — MEDROXYPROGESTERONE ACETATE 150 MG/ML
150 INJECTION, SUSPENSION INTRAMUSCULAR
Status: SHIPPED | OUTPATIENT
Start: 2023-04-06 | End: 2024-09-26

## 2023-04-05 NOTE — TELEPHONE ENCOUNTER
Good Morning Lisha Dos Santos is coming tomorrow for both her annual appointment with you and her depo-provera injection. I pended the depo order with a start date of tomorrow. Do you mind signing it whenever you have a chance?    Thank you so much,    -Ceci PAGAN

## 2023-05-04 ENCOUNTER — OFFICE VISIT (OUTPATIENT)
Dept: URGENT CARE | Facility: CLINIC | Age: 28
End: 2023-05-04
Payer: MEDICAID

## 2023-05-04 VITALS
OXYGEN SATURATION: 97 % | DIASTOLIC BLOOD PRESSURE: 90 MMHG | HEART RATE: 110 BPM | RESPIRATION RATE: 18 BRPM | BODY MASS INDEX: 33.38 KG/M2 | WEIGHT: 170 LBS | SYSTOLIC BLOOD PRESSURE: 130 MMHG | HEIGHT: 60 IN | TEMPERATURE: 99 F

## 2023-05-04 DIAGNOSIS — N89.8 VAGINAL ITCHING: Primary | ICD-10-CM

## 2023-05-04 LAB
B-HCG UR QL: NEGATIVE
BILIRUB UR QL STRIP: NEGATIVE
CTP QC/QA: YES
GLUCOSE UR QL STRIP: NEGATIVE
KETONES UR QL STRIP: POSITIVE
LEUKOCYTE ESTERASE UR QL STRIP: NEGATIVE
PH, POC UA: 6.5 (ref 5–8)
POC BLOOD, URINE: POSITIVE
POC NITRATES, URINE: NEGATIVE
PROT UR QL STRIP: NEGATIVE
SP GR UR STRIP: 1.02 (ref 1–1.03)
UROBILINOGEN UR STRIP-ACNC: ABNORMAL (ref 0.1–1.1)

## 2023-05-04 PROCEDURE — 81514 NFCT DS BV&VAGINITIS DNA ALG: CPT | Performed by: NURSE PRACTITIONER

## 2023-05-04 PROCEDURE — 99203 PR OFFICE/OUTPT VISIT, NEW, LEVL III, 30-44 MIN: ICD-10-PCS | Mod: S$GLB,,, | Performed by: NURSE PRACTITIONER

## 2023-05-04 PROCEDURE — 81025 URINE PREGNANCY TEST: CPT | Mod: S$GLB,,, | Performed by: NURSE PRACTITIONER

## 2023-05-04 PROCEDURE — 81003 POCT URINALYSIS, DIPSTICK, AUTOMATED, W/O SCOPE: ICD-10-PCS | Mod: QW,S$GLB,, | Performed by: NURSE PRACTITIONER

## 2023-05-04 PROCEDURE — 87591 N.GONORRHOEAE DNA AMP PROB: CPT | Performed by: NURSE PRACTITIONER

## 2023-05-04 PROCEDURE — 81003 URINALYSIS AUTO W/O SCOPE: CPT | Mod: QW,S$GLB,, | Performed by: NURSE PRACTITIONER

## 2023-05-04 PROCEDURE — 99203 OFFICE O/P NEW LOW 30 MIN: CPT | Mod: S$GLB,,, | Performed by: NURSE PRACTITIONER

## 2023-05-04 PROCEDURE — 87086 URINE CULTURE/COLONY COUNT: CPT | Performed by: NURSE PRACTITIONER

## 2023-05-04 PROCEDURE — 81025 POCT URINE PREGNANCY: ICD-10-PCS | Mod: S$GLB,,, | Performed by: NURSE PRACTITIONER

## 2023-05-04 RX ORDER — FLUCONAZOLE 150 MG/1
150 TABLET ORAL DAILY
Qty: 1 TABLET | Refills: 0 | Status: SHIPPED | OUTPATIENT
Start: 2023-05-04 | End: 2023-05-05

## 2023-05-04 NOTE — PATIENT INSTRUCTIONS
PLEASE READ YOUR DISCHARGE INSTRUCTIONS ENTIRELY AS IT CONTAINS IMPORTANT INFORMATION.     Take one diflucan when you get it,      A culture of your vagina was sent. You will be contacted once it results in about 3-5 days and appropriate action will be taken.      Try taking an over the counter probiotic or eating yogurt.     You can use over the counter clotrimazole (lotrimin) cream to the outer vagina for irritation.      Please return or see your primary care doctor if you develop new or worsening symptoms.      Please arrange follow up with your primary medical clinic as soon as possible. You must understand that you've received an Urgent Care treatment only and that you may be released before all of your medical problems are known or treated. You, the patient, will arrange for follow up as instructed. If your symptoms worsen or fail to improve you should go to the Emergency Room.

## 2023-05-04 NOTE — PROGRESS NOTES
Subjective:      Patient ID: Lisha Sousa is a 28 y.o. female.    Vitals:  height is 5' (1.524 m) and weight is 77.1 kg (170 lb). Her temperature is 98.9 °F (37.2 °C). Her blood pressure is 130/90 (abnormal) and her pulse is 110. Her respiration is 18 and oxygen saturation is 97%.     Chief Complaint: Vaginal Itching    28 yr female present with Vaginal  itching. Pt states that she is not having discharge but vaginal itching since Thursday last week . Treatments at home include vagistat with no relief, denies any vaginal pain, denies concern for STD but would like to get checked, denies urinary frequency, urgency, dysuria or hematuria, patient she reports he is on Depo shots, denies fever, body aches or chills, denies cough, wheezing or shortness of breath, denies nausea, vomiting, diarrhea or abdominal pain, denies chest pain or dizziness positional lightheadedness, denies sore throat or trouble swallowing, denies loss of taste or smell, or any other symptoms        Vaginal Itching  The patient's primary symptoms include genital itching. The patient's pertinent negatives include no genital lesions, genital odor, pelvic pain, vaginal bleeding or vaginal discharge. This is a new problem. The current episode started in the past 7 days. The problem has been gradually worsening. The patient is experiencing no pain. She is not pregnant. Pertinent negatives include no anorexia or frequency. The vaginal discharge was normal. The vaginal bleeding is spotting. She has not been passing clots. She has not been passing tissue. Nothing aggravates the symptoms. The treatment provided no relief.     Genitourinary:  Negative for frequency, vaginal discharge and pelvic pain.    Objective:     Physical Exam   Constitutional: She is oriented to person, place, and time. She appears well-developed.   HENT:   Head: Normocephalic and atraumatic.   Ears:   Right Ear: External ear normal.   Left Ear: External ear normal.   Nose: Nose  normal. No nasal deformity. No epistaxis.   Mouth/Throat: Oropharynx is clear and moist and mucous membranes are normal.   Eyes: Lids are normal.   Neck: Trachea normal and phonation normal. Neck supple.   Cardiovascular: Normal rate, regular rhythm and normal pulses.   Pulmonary/Chest: Effort normal and breath sounds normal.   Abdominal: Normal appearance and bowel sounds are normal. She exhibits no distension. Soft. There is no abdominal tenderness. There is no left CVA tenderness and no right CVA tenderness.   Genitourinary:         Comments: Exam deferred, pt self swabbed     Neurological: She is alert and oriented to person, place, and time.   Skin: Skin is warm, dry and intact.   Psychiatric: Her speech is normal and behavior is normal.   Nursing note and vitals reviewed.  Results for orders placed or performed in visit on 05/04/23   POCT Urinalysis, Dipstick, Automated, W/O Scope   Result Value Ref Range    POC Blood, Urine Positive (A) Negative    POC Bilirubin, Urine Negative Negative    POC Urobilinogen, Urine Norm 0.1 - 1.1    POC Ketones, Urine Positive (A) Negative    POC Protein, Urine Negative Negative    POC Nitrates, Urine Negative Negative    POC Glucose, Urine Negative Negative    pH, UA 6.5 5 - 8    POC Specific Gravity, Urine 1.020 1.003 - 1.029    POC Leukocytes, Urine Negative Negative   POCT urine pregnancy   Result Value Ref Range    POC Preg Test, Ur Negative Negative     Acceptable Yes          Patient in no acute distress.  Vitals reassuring.  Discussed results/diagnosis/plan in depth with patient in clinic. Strict precautions given to patient to monitor for worsening signs and symptoms. Advised to follow up with primary.All questions answered. Strict ER precautions given. If your symptoms worsens or fail to improve you should go to the Emergency Room. Discharge and follow-up instructions given verbally/printed. Discharge and follow-up instructions discussed with the  patient who expressed understanding and willingness to comply with my recommendations.Patient voiced understanding and in agreement with current treatment plan.     Please be advised this text was dictated with [employer] software and may contain errors due to translation.    Assessment:     1. Vaginal itching        Plan:       Vaginal itching  -     POCT Urinalysis, Dipstick, Automated, W/O Scope  -     POCT urine pregnancy  -     VAGINOSIS SCREEN BY DNA PROBE  -     fluconazole (DIFLUCAN) 150 MG Tab; Take 1 tablet (150 mg total) by mouth once daily. for 1 day  Dispense: 1 tablet; Refill: 0  -     C. trachomatis/N. gonorrhoeae by AMP DNA  -     Urine culture          Medical Decision Making:   Clinical Tests:   Lab Tests: Reviewed  Urgent Care Management:  Patient in no acute distress.  Vitals reassuring.  On exam, patient is nontoxic appearing and afebrile.  Lungs CTA.  Urinalysis and UPT as above.  STD lab work and BD swab sent off.  Will call Back results in 3-5 days.  Fluconazole prescribed.  Urinalysis with blood. no UTI symptoms reported. Will sent off urine culture for further evaluation..   Medication prescribed and over-the-counter medication discussed with patient at length.  Proper hydration advised.  I reiterated the importance of further evaluation if no improvement symptoms and follow-up with primary.         Patient Instructions   PLEASE READ YOUR DISCHARGE INSTRUCTIONS ENTIRELY AS IT CONTAINS IMPORTANT INFORMATION.     Take one diflucan when you get it,      A culture of your vagina was sent. You will be contacted once it results in about 3-5 days and appropriate action will be taken.      Try taking an over the counter probiotic or eating yogurt.     You can use over the counter clotrimazole (lotrimin) cream to the outer vagina for irritation.      Please return or see your primary care doctor if you develop new or worsening symptoms.      Please arrange follow up with your primary medical clinic as  soon as possible. You must understand that you've received an Urgent Care treatment only and that you may be released before all of your medical problems are known or treated. You, the patient, will arrange for follow up as instructed. If your symptoms worsen or fail to improve you should go to the Emergency Room.

## 2023-05-05 ENCOUNTER — TELEPHONE (OUTPATIENT)
Dept: URGENT CARE | Facility: CLINIC | Age: 28
End: 2023-05-05
Payer: MEDICAID

## 2023-05-05 ENCOUNTER — NURSE TRIAGE (OUTPATIENT)
Dept: ADMINISTRATIVE | Facility: CLINIC | Age: 28
End: 2023-05-05
Payer: MEDICAID

## 2023-05-05 LAB
BACTERIA UR CULT: NORMAL
BACTERIA UR CULT: NORMAL
BACTERIAL VAGINOSIS DNA: POSITIVE
C TRACH DNA SPEC QL NAA+PROBE: NOT DETECTED
CANDIDA GLABRATA DNA: NEGATIVE
CANDIDA KRUSEI DNA: NEGATIVE
CANDIDA RRNA VAG QL PROBE: NEGATIVE
N GONORRHOEA DNA SPEC QL NAA+PROBE: NOT DETECTED
T VAGINALIS RRNA GENITAL QL PROBE: NEGATIVE

## 2023-05-05 NOTE — TELEPHONE ENCOUNTER
Attempted to contact patient with +BV result.  No answer.  Lvm to [facility] x1.  Flagyl sent to her pharmacy by another provider.

## 2023-05-05 NOTE — TELEPHONE ENCOUNTER
Pt seen in UC yesterday for vaginal burning & irriation. States my chart results show BV, she spoke with UC for rx prior to calling & was told someone would call her back since provider she saw yesterday not in today. Pt states she is in a lot of pain, trying to wait until she hears from UC or OBGYN. +vaginal redness & swelling. Very painful to touch.     Dispo-be seen today. Discussed home care & callback symptoms. Advised priority message would be routed to OBGYN requesting callback. In the event she does not hear back from clinic, UC, and/or symptoms change/worsen before then, to f/u in UC for re-eval.Pt vu.      Reason for Disposition   MILD-MODERATE pain and present > 24 hours  (Exception: Chronic pain.)    Additional Information   Negative: Sounds like a life-threatening emergency to the triager   Negative: Patient sounds very sick or weak to the triager   Negative: SEVERE pain and not improved 2 hours after pain medicine   Negative: Genital area looks infected (e.g., draining sore, spreading redness) and fever   Negative: Something is hanging out of the vagina and can't easily be pushed back inside    Protocols used: Vaginal Symptoms-A-OH

## 2023-05-06 ENCOUNTER — TELEPHONE (OUTPATIENT)
Dept: URGENT CARE | Facility: CLINIC | Age: 28
End: 2023-05-06
Payer: MEDICAID

## 2023-05-09 ENCOUNTER — TELEPHONE (OUTPATIENT)
Dept: OBSTETRICS AND GYNECOLOGY | Facility: CLINIC | Age: 28
End: 2023-05-09
Payer: MEDICAID

## 2023-05-09 NOTE — TELEPHONE ENCOUNTER
Patient states that she was treated after er visit.  Patient has follow up appt in June with Dr. Phillips.  Informed patient to give us a call or send us a message if she is having any problems or concerns.    RENETTA Bowen

## 2023-05-09 NOTE — TELEPHONE ENCOUNTER
----- Message from Haroon Wei sent at 5/5/2023  3:22 PM CDT -----  Contact: pt  .Type:  Needs Medical Advice    Who Called: pt  Symptoms (please be specific): VD    Pharmacy name and phone #:  [facility]   Phone: [phone]  Fax:  [phone]  Would the patient rather a call back or a response via [facility]? Call back  Best Call Back Number: [phone]  Additional Information:  Pt. Is calling because she went to urgent care on yesterday and they treated her for yeast infection.  She received a call back today from the urgent care telling she has VD and they can not call anything in until Monday because the NP she saw on yesterday is out of the office.   Pt. Is calling to see if the provider can call her something in.

## 2023-06-12 ENCOUNTER — CLINICAL SUPPORT (OUTPATIENT)
Dept: OBSTETRICS AND GYNECOLOGY | Facility: CLINIC | Age: 28
End: 2023-06-12
Payer: MEDICAID

## 2023-06-12 ENCOUNTER — OFFICE VISIT (OUTPATIENT)
Dept: OBSTETRICS AND GYNECOLOGY | Facility: CLINIC | Age: 28
End: 2023-06-12
Payer: MEDICAID

## 2023-06-12 VITALS
BODY MASS INDEX: 34.55 KG/M2 | WEIGHT: 176.94 LBS | DIASTOLIC BLOOD PRESSURE: 86 MMHG | SYSTOLIC BLOOD PRESSURE: 122 MMHG

## 2023-06-12 DIAGNOSIS — Z30.09 GENERAL COUNSELING AND ADVICE ON FEMALE CONTRACEPTION: Primary | ICD-10-CM

## 2023-06-12 DIAGNOSIS — Z30.42 SURVEILLANCE FOR DEPO-PROVERA CONTRACEPTION: Primary | ICD-10-CM

## 2023-06-12 DIAGNOSIS — Z01.419 WELL WOMAN EXAM WITH ROUTINE GYNECOLOGICAL EXAM: ICD-10-CM

## 2023-06-12 LAB
B-HCG UR QL: NEGATIVE
CTP QC/QA: YES

## 2023-06-12 PROCEDURE — 99999 PR PBB SHADOW E&M-EST. PATIENT-LVL III: ICD-10-PCS | Mod: PBBFAC,,, | Performed by: OBSTETRICS & GYNECOLOGY

## 2023-06-12 PROCEDURE — 3074F PR MOST RECENT SYSTOLIC BLOOD PRESSURE < 130 MM HG: ICD-10-PCS | Mod: CPTII,,, | Performed by: OBSTETRICS & GYNECOLOGY

## 2023-06-12 PROCEDURE — 81025 URINE PREGNANCY TEST: CPT | Mod: PBBFAC,PO | Performed by: OBSTETRICS & GYNECOLOGY

## 2023-06-12 PROCEDURE — 1160F RVW MEDS BY RX/DR IN RCRD: CPT | Mod: CPTII,,, | Performed by: OBSTETRICS & GYNECOLOGY

## 2023-06-12 PROCEDURE — 96372 THER/PROPH/DIAG INJ SC/IM: CPT | Mod: PBBFAC,PO

## 2023-06-12 PROCEDURE — 1160F PR REVIEW ALL MEDS BY PRESCRIBER/CLIN PHARMACIST DOCUMENTED: ICD-10-PCS | Mod: CPTII,,, | Performed by: OBSTETRICS & GYNECOLOGY

## 2023-06-12 PROCEDURE — 1159F MED LIST DOCD IN RCRD: CPT | Mod: CPTII,,, | Performed by: OBSTETRICS & GYNECOLOGY

## 2023-06-12 PROCEDURE — 3079F DIAST BP 80-89 MM HG: CPT | Mod: CPTII,,, | Performed by: OBSTETRICS & GYNECOLOGY

## 2023-06-12 PROCEDURE — 3079F PR MOST RECENT DIASTOLIC BLOOD PRESSURE 80-89 MM HG: ICD-10-PCS | Mod: CPTII,,, | Performed by: OBSTETRICS & GYNECOLOGY

## 2023-06-12 PROCEDURE — 3008F BODY MASS INDEX DOCD: CPT | Mod: CPTII,,, | Performed by: OBSTETRICS & GYNECOLOGY

## 2023-06-12 PROCEDURE — 3008F PR BODY MASS INDEX (BMI) DOCUMENTED: ICD-10-PCS | Mod: CPTII,,, | Performed by: OBSTETRICS & GYNECOLOGY

## 2023-06-12 PROCEDURE — 3074F SYST BP LT 130 MM HG: CPT | Mod: CPTII,,, | Performed by: OBSTETRICS & GYNECOLOGY

## 2023-06-12 PROCEDURE — 99395 PREV VISIT EST AGE 18-39: CPT | Mod: S$PBB,,, | Performed by: OBSTETRICS & GYNECOLOGY

## 2023-06-12 PROCEDURE — 99395 PR PREVENTIVE VISIT,EST,18-39: ICD-10-PCS | Mod: S$PBB,,, | Performed by: OBSTETRICS & GYNECOLOGY

## 2023-06-12 PROCEDURE — 1159F PR MEDICATION LIST DOCUMENTED IN MEDICAL RECORD: ICD-10-PCS | Mod: CPTII,,, | Performed by: OBSTETRICS & GYNECOLOGY

## 2023-06-12 PROCEDURE — 88175 CYTOPATH C/V AUTO FLUID REDO: CPT | Performed by: OBSTETRICS & GYNECOLOGY

## 2023-06-12 PROCEDURE — 99999 PR PBB SHADOW E&M-EST. PATIENT-LVL III: CPT | Mod: PBBFAC,,, | Performed by: OBSTETRICS & GYNECOLOGY

## 2023-06-12 PROCEDURE — 99213 OFFICE O/P EST LOW 20 MIN: CPT | Mod: 25,PBBFAC,PO | Performed by: OBSTETRICS & GYNECOLOGY

## 2023-06-12 RX ORDER — MEDROXYPROGESTERONE ACETATE 150 MG/ML
150 INJECTION, SUSPENSION INTRAMUSCULAR
Status: COMPLETED | OUTPATIENT
Start: 2023-06-12 | End: 2023-06-12

## 2023-06-12 RX ADMIN — MEDROXYPROGESTERONE ACETATE 150 MG: 150 INJECTION, SUSPENSION INTRAMUSCULAR at 11:06

## 2023-06-12 NOTE — PROGRESS NOTES
HPI:   28 y.o.   OB History          1    Para   1    Term   1            AB        Living   1         SAB        IAB        Ectopic        Multiple        Live Births                  No LMP recorded (lmp unknown). Patient has had an injection.    Patient is  here for her annual gynecologic exam.  She has no complaints.     ROS:  GENERAL: No fever, chills, fatigability or weight loss.  SKIN: No rashes, itching or changes in color or texture of skin.  HEAD: No headaches or recent head trauma.  EYES: Visual acuity fine. No photophobia, ocular pain or diplopia.  EARS: Denies ear pain, discharge or vertigo.  NOSE: No loss of smell, no epistaxis or postnasal drip.  MOUTH & THROAT: No hoarseness or change in voice. No excessive gum bleeding.  NODES: Denies swollen glands.  CHEST: Denies SNYDER, cyanosis, wheezing, cough and sputum production.  CARDIOVASCULAR: Denies chest pain, PND, orthopnea or reduced exercise tolerance.  ABDOMEN: Appetite fine. No weight loss. Denies diarrhea, abdominal pain, hematemesis or blood in stool.  URINARY: No flank pain, dysuria or hematuria.  PERIPHERAL VASCULAR: No claudication or cyanosis.  MUSCULOSKELETAL: No joint stiffness or swelling. Denies back pain.  NEUROLOGIC: No history of seizures, paralysis, alteration of gait or coordination.    PE:   /86   Wt 80.3 kg (176 lb 14.7 oz)   LMP  (LMP Unknown)   BMI 34.55 kg/m²   APPEARANCE: Well nourished, well developed, in no acute distress.  NECK: Neck symmetric without masses or thyromegaly.  BREASTS: Symmetrical, no skin changes or visible lesions. No palpable masses, nipple discharge or adenopathy bilaterally.  ABDOMEN: Flat. Soft. No tenderness or masses. No hepatosplenomegaly. No hernias. No CVA tenderness.  VULVA: No lesions. Normal female genitalia.  URETHRAL MEATUS: Normal size and location, no lesions, no prolapse.  URETHRA: No masses, tenderness, prolapse or scarring.  VAGINA: Moist and well rugated, no discharge,  no significant cystocele or rectocele.  CERVIX: No lesions and discharge. PAP done.  UTERUS: Normal size, regular shape, mobile, non-tender, bladder base nontender.  ADNEXA: No masses, tenderness or CDS nodularity.  ANUS PERINEUM: Normal.    PROCEDURES:  Pap smear    Assessment:  Normal Gynecologic Exam    Plan:  Mammogram and Colonoscopy if indicated by current recommendations.  Return to clinic in one year or for any problems or complaints.  Ed dx hsv  \ type 1  Depo today

## 2023-06-12 NOTE — PROGRESS NOTES
150 mg Depo-Provera given RUOQG. Patient has been receiving Depo. Patient's last depo was on 01/18/23. Urine Pregnancy Test (UPT) NEGATIVE per chart (done at appointment prior to this injection appointment this morning). Depo sheet given. Next Depo due 8/28/23-09/11/23. Next injection scheduled for Wednesday, 08/30/23 @ 3:45 PM. Patient advised to wait 15 min without signs/symptoms. Report any adverse reaction. Patient verbalized understanding. Patient tolerated well.

## 2023-08-30 ENCOUNTER — CLINICAL SUPPORT (OUTPATIENT)
Dept: OBSTETRICS AND GYNECOLOGY | Facility: CLINIC | Age: 28
End: 2023-08-30
Payer: MEDICAID

## 2023-08-30 DIAGNOSIS — Z30.42 SURVEILLANCE FOR DEPO-PROVERA CONTRACEPTION: Primary | ICD-10-CM

## 2023-08-30 PROCEDURE — 99999PBSHW PR PBB SHADOW TECHNICAL ONLY FILED TO HB: Mod: PBBFAC,,,

## 2023-08-30 PROCEDURE — 99999PBSHW PR PBB SHADOW TECHNICAL ONLY FILED TO HB: ICD-10-PCS | Mod: PBBFAC,,,

## 2023-08-30 PROCEDURE — 96372 THER/PROPH/DIAG INJ SC/IM: CPT | Mod: PBBFAC,PO

## 2023-08-30 RX ADMIN — MEDROXYPROGESTERONE ACETATE 150 MG: 150 INJECTION, SUSPENSION INTRAMUSCULAR at 03:08

## 2023-08-30 NOTE — PROGRESS NOTES
150 mg Depo-Provera given LUOQG. Patient has been receiving Depo. Patient states she has not had any complications while on depo. Depo sheet given. Next Depo due 11/15/2023-11/29/2023. Next injection scheduled for Thursday, 11/16/2023 @ 3:45 PM. Patient advised to wait 15 min without signs/symptoms. Report any adverse reaction. Patient verbalized understanding. Patient tolerated well.

## 2023-11-16 ENCOUNTER — CLINICAL SUPPORT (OUTPATIENT)
Dept: OBSTETRICS AND GYNECOLOGY | Facility: CLINIC | Age: 28
End: 2023-11-16

## 2023-11-16 DIAGNOSIS — Z30.42 SURVEILLANCE FOR DEPO-PROVERA CONTRACEPTION: Primary | ICD-10-CM

## 2023-11-16 PROCEDURE — 99999 PR PBB SHADOW E&M-EST. PATIENT-LVL I: ICD-10-PCS | Mod: PBBFAC,,,

## 2023-11-16 PROCEDURE — 99999 PR PBB SHADOW E&M-EST. PATIENT-LVL I: CPT | Mod: PBBFAC,,,

## 2023-11-16 PROCEDURE — 99999PBSHW PR PBB SHADOW TECHNICAL ONLY FILED TO HB: ICD-10-PCS | Mod: PBBFAC,,,

## 2023-11-16 PROCEDURE — 99999PBSHW PR PBB SHADOW TECHNICAL ONLY FILED TO HB: Mod: PBBFAC,,,

## 2023-11-16 PROCEDURE — 96372 THER/PROPH/DIAG INJ SC/IM: CPT | Mod: PBBFAC,PO

## 2023-11-16 RX ADMIN — MEDROXYPROGESTERONE ACETATE 150 MG: 150 INJECTION, SUSPENSION INTRAMUSCULAR at 04:11

## 2023-11-16 NOTE — PROGRESS NOTES
150 mg Depo-Provera given RUOQG. Patient has been receiving Depo. Patient states she has not had any complications while on depo. Depo sheet given. Next Depo due 02/01/24-02/15/24. Next injection scheduled for Thursday,02/01/24 @ 3:45 PM. Patient advised to wait 15 min without signs/symptoms. Report any adverse reaction. Patient verbalized understanding. Patient tolerated well.

## 2024-02-01 ENCOUNTER — CLINICAL SUPPORT (OUTPATIENT)
Dept: OBSTETRICS AND GYNECOLOGY | Facility: CLINIC | Age: 29
End: 2024-02-01

## 2024-02-01 DIAGNOSIS — Z30.42 SURVEILLANCE FOR DEPO-PROVERA CONTRACEPTION: Primary | ICD-10-CM

## 2024-02-01 PROCEDURE — 96372 THER/PROPH/DIAG INJ SC/IM: CPT | Mod: PBBFAC,PO

## 2024-02-01 PROCEDURE — 99999PBSHW PR PBB SHADOW TECHNICAL ONLY FILED TO HB: Mod: PBBFAC,,,

## 2024-02-01 RX ADMIN — MEDROXYPROGESTERONE ACETATE 150 MG: 150 INJECTION, SUSPENSION INTRAMUSCULAR at 04:02

## 2024-02-01 NOTE — PROGRESS NOTES
150 mg Depo-Provera given LUOG. Patient has been receiving Depo. Patient states she has not had any complications while on depo. Depo sheet given. Next Depo due 04/16-05/03. Next injection scheduled for 04/23 @ 0800. Patient advised to wait 15 min without signs/symptoms. Report any adverse reaction. Patient verbalized understanding. Patient tolerated we

## 2024-03-13 ENCOUNTER — TELEPHONE (OUTPATIENT)
Dept: OBSTETRICS AND GYNECOLOGY | Facility: CLINIC | Age: 29
End: 2024-03-13

## 2024-03-13 NOTE — TELEPHONE ENCOUNTER
----- Message from Domonique Hoyos sent at 3/13/2024 12:02 PM CDT -----  Type:  Needs Medical Advice    Who Called: pt  Symptoms (please be specific): needs to push back her injection a  week from her appt 04/23 but still would have to be an 8 am appt please call pt back    Would the patient rather a call back or a response via [facility]? call  Best Call Back Number: [phone]  Additional Information:

## 2024-04-24 ENCOUNTER — HOSPITAL ENCOUNTER (EMERGENCY)
Facility: HOSPITAL | Age: 29
Discharge: HOME | End: 2024-04-24
Attending: EMERGENCY MEDICINE

## 2024-04-24 VITALS
SYSTOLIC BLOOD PRESSURE: 139 MMHG | HEIGHT: 60 IN | RESPIRATION RATE: 20 BRPM | DIASTOLIC BLOOD PRESSURE: 88 MMHG | HEART RATE: 100 BPM | WEIGHT: 175 LBS | TEMPERATURE: 99 F | BODY MASS INDEX: 34.36 KG/M2 | OXYGEN SATURATION: 99 %

## 2024-04-24 DIAGNOSIS — L02.91 ABSCESS: Primary | ICD-10-CM

## 2024-04-24 LAB
B-HCG UR QL: NEGATIVE
CTP QC/QA: YES

## 2024-04-24 PROCEDURE — 81025 URINE PREGNANCY TEST: CPT | Mod: ER | Performed by: EMERGENCY MEDICINE

## 2024-04-24 PROCEDURE — 56405 I&D VULVA/PERINEAL ABSCESS: CPT | Mod: ER

## 2024-04-24 PROCEDURE — 99284 EMERGENCY DEPT VISIT MOD MDM: CPT | Mod: ER,25

## 2024-04-24 PROCEDURE — 25000003 PHARM REV CODE 250: Mod: ER | Performed by: PHYSICIAN ASSISTANT

## 2024-04-24 RX ORDER — SULFAMETHOXAZOLE AND TRIMETHOPRIM 800; 160 MG/1; MG/1
1 TABLET ORAL 2 TIMES DAILY
Qty: 14 TABLET | Refills: 0 | Status: SHIPPED | OUTPATIENT
Start: 2024-04-24 | End: 2024-05-01

## 2024-04-24 RX ORDER — LIDOCAINE HYDROCHLORIDE 10 MG/ML
10 INJECTION INFILTRATION; PERINEURAL
Status: COMPLETED | OUTPATIENT
Start: 2024-04-24 | End: 2024-04-24

## 2024-04-24 RX ADMIN — LIDOCAINE HYDROCHLORIDE 10 ML: 10 INJECTION, SOLUTION INFILTRATION; PERINEURAL at 06:04
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
## 2024-04-25 NOTE — DISCHARGE INSTRUCTIONS
Please take the prescribed medications according to the directions on the bottle.  Please make sure to follow up with either your primary care doctor or your OBGYN within 3-5 days to have your abscess re-evaluated and to ensure that it is healing.  Read the patient education material regarding abscess discharge instructions.  Make sure to sit in a warm bath for at least 15 minutes at a time multiple times a day to promote further drainage.  When you leave for the day, cover the area and gauze to absorb any drainage.  If your symptoms worsen you may return to the ED for reevaluation.

## 2024-05-14 ENCOUNTER — CLINICAL SUPPORT (OUTPATIENT)
Dept: OBSTETRICS AND GYNECOLOGY | Facility: CLINIC | Age: 29
End: 2024-05-14

## 2024-05-14 DIAGNOSIS — Z30.42 SURVEILLANCE FOR DEPO-PROVERA CONTRACEPTION: Primary | ICD-10-CM

## 2024-05-14 PROCEDURE — 99999PBSHW PR PBB SHADOW TECHNICAL ONLY FILED TO HB: Mod: PBBFAC,,,

## 2024-05-14 RX ADMIN — MEDROXYPROGESTERONE ACETATE 150 MG: 150 INJECTION, SUSPENSION INTRAMUSCULAR at 04:05

## 2024-05-14 NOTE — PROGRESS NOTES
150 mg Depo-Provera given RUOQG. Patient has been receiving Depo. Patient states she has not had any complications while on depo. Depo sheet given. Next Depo due 07/30-08/13. Next injection scheduled for 08/06 @ 1600. Patient advised to wait 15 min without signs/symptoms. Report any adverse reaction. Patient verbalized understanding

## 2024-08-06 ENCOUNTER — CLINICAL SUPPORT (OUTPATIENT)
Dept: OBSTETRICS AND GYNECOLOGY | Facility: CLINIC | Age: 29
End: 2024-08-06

## 2024-08-06 DIAGNOSIS — Z30.42 SURVEILLANCE FOR DEPO-PROVERA CONTRACEPTION: Primary | ICD-10-CM

## 2024-08-06 PROCEDURE — 99999PBSHW PR PBB SHADOW TECHNICAL ONLY FILED TO HB: Mod: PBBFAC,,,

## 2024-08-06 RX ADMIN — MEDROXYPROGESTERONE ACETATE 150 MG: 150 INJECTION, SUSPENSION INTRAMUSCULAR at 02:08

## 2024-11-04 ENCOUNTER — CLINICAL SUPPORT (OUTPATIENT)
Dept: OBSTETRICS AND GYNECOLOGY | Facility: CLINIC | Age: 29
End: 2024-11-04

## 2024-11-04 DIAGNOSIS — Z30.42 SURVEILLANCE FOR DEPO-PROVERA CONTRACEPTION: Primary | ICD-10-CM

## 2024-11-04 PROCEDURE — 99999PBSHW PR PBB SHADOW TECHNICAL ONLY FILED TO HB: Mod: PBBFAC,,,

## 2024-11-04 PROCEDURE — 96372 THER/PROPH/DIAG INJ SC/IM: CPT | Mod: PBBFAC,PO

## 2024-11-04 RX ADMIN — MEDROXYPROGESTERONE ACETATE 150 MG: 150 INJECTION, SUSPENSION INTRAMUSCULAR at 03:11

## 2024-11-04 NOTE — PROGRESS NOTES
150 mg Depo-Provera given RUOQG. Patient has been receiving Depo. Patient states she has not had any complications while on Depo. Next Injection due 01/20-02/03. Pt will wait until next injection until after she see's her provider she might want to switch b/c. Patient advised to wait 15 min without signs/symptoms. Report any adverse reaction. Patient verbalized understanding

## 2025-02-12 ENCOUNTER — TELEPHONE (OUTPATIENT)
Dept: ORTHOPEDICS | Facility: CLINIC | Age: 30
End: 2025-02-12

## 2025-02-12 ENCOUNTER — PATIENT MESSAGE (OUTPATIENT)
Dept: ORTHOPEDICS | Facility: CLINIC | Age: 30
End: 2025-02-12

## 2025-02-14 DIAGNOSIS — M25.532 LEFT WRIST PAIN: ICD-10-CM

## 2025-02-14 DIAGNOSIS — M79.642 LEFT HAND PAIN: Primary | ICD-10-CM

## 2025-02-17 ENCOUNTER — TELEPHONE (OUTPATIENT)
Dept: ORTHOPEDICS | Facility: CLINIC | Age: 30
End: 2025-02-17
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
## 2025-02-17 NOTE — TELEPHONE ENCOUNTER
Called patient to reschedule her.  I offered for her to be seen at anytime this afternoon that is convenient for her.  Provided her the office phone number to call back to reschedule.    SHAUN

## 2025-02-17 NOTE — TELEPHONE ENCOUNTER
Called pt to see if she is still available for an appt today or would like to r/s, She did not answer, left vm.

## 2025-03-26 ENCOUNTER — NURSE TRIAGE (OUTPATIENT)
Dept: ADMINISTRATIVE | Facility: CLINIC | Age: 30
End: 2025-03-26

## 2025-03-26 NOTE — TELEPHONE ENCOUNTER
Patient is 1 month post ORIF. She c/o tremors to her thumb. Advised per protocol to speak with her surgeon. Patient verbalizes understanding. Best contact number is [phone]. Advised the patient to call back with any further questions or if symptoms worsen.        Reason for Disposition   [1] Caller has URGENT question AND [2] triager unable to answer question    Additional Information   Negative: Sounds like a life-threatening emergency to the triager   Negative: [1] Widespread rash AND [2] bright red, sunburn-like   Negative: [1] SEVERE headache AND [2] after spinal (epidural) anesthesia   Negative: [1] Vomiting AND [2] persists > 4 hours   Negative: [1] Vomiting AND [2] abdomen looks much more swollen than usual   Negative: [1] Drinking very little AND [2] dehydration suspected (e.g., no urine > 12 hours, very dry mouth, very lightheaded)   Negative: Patient sounds very sick or weak to the triager   Negative: Sounds like a serious complication to the triager   Negative: Fever > 100.4 F (38.0 C)   Negative: [1] SEVERE post-op pain (e.g., excruciating, pain scale 8-10) AND [2] not controlled with pain medications    Protocols used: Post-Op Symptoms and [id number]

## (undated) DEVICE — SYR B-D DISP CONTROL 10CC100/C

## (undated) DEVICE — MANIFOLD 4 PORT

## (undated) DEVICE — BLADE SURG CARBON STEEL SZ11

## (undated) DEVICE — SEE MEDLINE ITEM 157116

## (undated) DEVICE — APPLICATOR ARISTA FLEX XL

## (undated) DEVICE — CATH URETHRAL 16FR RED

## (undated) DEVICE — NDL INSUF ULTRA VERESS 120MM

## (undated) DEVICE — DRAPE LAVH LAPAROSCOPY W/FLUID

## (undated) DEVICE — SEE MEDLINE ITEM 157131

## (undated) DEVICE — SEE MEDLINE ITEM 156955

## (undated) DEVICE — PACK BASIC

## (undated) DEVICE — SEE MEDLINE ITEM 157117

## (undated) DEVICE — SUT MONOCRYL 3-0 PS-2 UND

## (undated) DEVICE — SEE MEDLINE ITEM 152622

## (undated) DEVICE — CLOSURE SKIN STERI STRIP 1/2X4

## (undated) DEVICE — COVER OVERHEAD SURG LT BLUE

## (undated) DEVICE — SEE MEDLINE ITEM 154981

## (undated) DEVICE — TUBING INSUFFLATION 10

## (undated) DEVICE — TROCAR ENDOPATH EXCEL

## (undated) DEVICE — POWDER ARISTA AH 1GM

## (undated) DEVICE — GLOVE PROTEXIS HYDROGEL SZ7.5

## (undated) DEVICE — APPLICATOR CHLORAPREP ORN 26ML

## (undated) DEVICE — ELECTRODE REM PLYHSV RETURN 9

## (undated) DEVICE — TROCAR ENDOPATH EXCEL DILATING

## (undated) DEVICE — SEE MEDLINE ITEM 146292

## (undated) DEVICE — BANDAGE ADHESIVE

## (undated) DEVICE — SEALER LIGASURE LAP 37CM 5MM

## (undated) DEVICE — NDL HYPO REG 25G X 1 1/2

## (undated) DEVICE — DRESSING LEUKOPLAST FLEX 1X3IN

## (undated) DEVICE — KIT ANTIFOG

## (undated) DEVICE — SEE MEDLINE ITEM 146355

## (undated) DEVICE — PACK LAPAROSCOPY TIBURON

## (undated) DEVICE — TROCAR ENDOPATH XCEL 5X75MM

## (undated) DEVICE — STRIP STERI REIN CLSR 1/2X2IN

## (undated) DEVICE — SYR 10CC LUER LOCK

## (undated) DEVICE — UNDERWEAR ADULT LARGE PULL ON